# Patient Record
Sex: FEMALE | Race: WHITE | HISPANIC OR LATINO | Employment: FULL TIME | URBAN - METROPOLITAN AREA
[De-identification: names, ages, dates, MRNs, and addresses within clinical notes are randomized per-mention and may not be internally consistent; named-entity substitution may affect disease eponyms.]

---

## 2017-01-26 ENCOUNTER — OFFICE VISIT (OUTPATIENT)
Dept: PEDIATRICS | Facility: CLINIC | Age: 27
End: 2017-01-26
Payer: COMMERCIAL

## 2017-01-26 VITALS
HEART RATE: 79 BPM | DIASTOLIC BLOOD PRESSURE: 58 MMHG | OXYGEN SATURATION: 98 % | HEIGHT: 65 IN | SYSTOLIC BLOOD PRESSURE: 101 MMHG | WEIGHT: 143.8 LBS | BODY MASS INDEX: 23.96 KG/M2 | TEMPERATURE: 96.9 F

## 2017-01-26 DIAGNOSIS — R39.9 UTI SYMPTOMS: Primary | ICD-10-CM

## 2017-01-26 DIAGNOSIS — N30.00 ACUTE CYSTITIS WITHOUT HEMATURIA: ICD-10-CM

## 2017-01-26 DIAGNOSIS — R82.90 NONSPECIFIC FINDING ON EXAMINATION OF URINE: ICD-10-CM

## 2017-01-26 LAB
ALBUMIN UR-MCNC: 30 MG/DL
APPEARANCE UR: ABNORMAL
BACTERIA #/AREA URNS HPF: ABNORMAL /HPF
BILIRUB UR QL STRIP: NEGATIVE
COLOR UR AUTO: YELLOW
GLUCOSE UR STRIP-MCNC: NEGATIVE MG/DL
HGB UR QL STRIP: ABNORMAL
KETONES UR STRIP-MCNC: 5 MG/DL
LEUKOCYTE ESTERASE UR QL STRIP: ABNORMAL
MUCOUS THREADS #/AREA URNS LPF: PRESENT /LPF
NITRATE UR QL: NEGATIVE
NON-SQ EPI CELLS #/AREA URNS LPF: ABNORMAL /LPF
PH UR STRIP: 6 PH (ref 5–7)
RBC #/AREA URNS AUTO: ABNORMAL /HPF (ref 0–2)
SP GR UR STRIP: 1.03 (ref 1–1.03)
URN SPEC COLLECT METH UR: ABNORMAL
UROBILINOGEN UR STRIP-MCNC: 2 MG/DL (ref 0–2)
WBC #/AREA URNS AUTO: ABNORMAL /HPF (ref 0–2)

## 2017-01-26 PROCEDURE — 99213 OFFICE O/P EST LOW 20 MIN: CPT | Performed by: INTERNAL MEDICINE

## 2017-01-26 PROCEDURE — 81001 URINALYSIS AUTO W/SCOPE: CPT | Performed by: INTERNAL MEDICINE

## 2017-01-26 PROCEDURE — 87086 URINE CULTURE/COLONY COUNT: CPT | Performed by: INTERNAL MEDICINE

## 2017-01-26 PROCEDURE — 87088 URINE BACTERIA CULTURE: CPT | Performed by: INTERNAL MEDICINE

## 2017-01-26 PROCEDURE — 87186 SC STD MICRODIL/AGAR DIL: CPT | Performed by: INTERNAL MEDICINE

## 2017-01-26 RX ORDER — NITROFURANTOIN 25; 75 MG/1; MG/1
100 CAPSULE ORAL 2 TIMES DAILY
Qty: 14 CAPSULE | Refills: 0 | Status: SHIPPED | OUTPATIENT
Start: 2017-01-26 | End: 2017-06-29

## 2017-01-26 NOTE — PATIENT INSTRUCTIONS
Urinary Tract Infections in Women  Urinary tract infections (UTIs) are most often caused by bacteria (germs). These bacteria enter the urinary tract. The bacteria may come from outside the body. Or they may travel from the skin outside the rectum or vagina into the urethra. Female anatomy makes it easier for bacteria from the bowel to enter a woman s urinary tract, which is the most common source of UTI. This means women develop UTIs more often than men. Pain in or around the urinary tract is a common UTI symptom. But the only way to know for sure if you have a UTI for the health care provider to test your urine. The two tests that may be done are the urinalysis and urine culture.  Types of UTIs    Cystitis: A bladder infection (cystitis) is the most common UTI in women. You may have urgent or frequent urination. You may also have pain, burning when you urinate, and bloody urine.    Urethritis: This is an inflamed urethra, which is the tube that carries urine from the bladder to outside the body. You may have lower stomach or back pain. You may also have urgent or frequent urination.    Pyelonephritis: This is a kidney infection. If not treated, it can be serious and damage your kidneys. In severe cases, you may be hospitalized. You may have a fever and lower back pain.  Medications to treat a UTI  Most UTIs are treated with antibiotics. These kill the bacteria. The length of time you need to take them depends on the type of infection. It may be as short as 3 days. If you have repeated UTIs, a low-dose antibiotic may be needed for several months. Take antibiotics exactly as directed. Don t stop taking them until all of the medication is gone. If you stop taking the antibiotic too soon, the infection may not go away, and you may develop a resistance to the antibiotic. This can make it much harder to treat.  Lifestyle changes to treat and prevent UTIs  The lifestyle changes below will help get rid of your UTI. They  may also help prevent future UTIs.    Drink plenty of fluids. This includes water, juice, or other caffeine-free drinks. Fluids help flush bacteria out of your body.    Empty your bladder. Always empty your bladder when you feel the urge to urinate. And always urinate before going to sleep. Urine that stays in your bladder can lead to infection. Try to urinate before and after sex as well.    Practice good personal hygiene. Wipe yourself from front to back after using the toilet. This helps keep bacteria from getting into the urethra.    Use condoms during sex. These help prevent UTIs caused by sexually transmitted bacteria. Also, avoid using spermicides during sex. These can increase the risk of UTIs. Choose other forms of birth control instead. For women who tend to get UTIs after sex, a low-dose of a preventive antibiotic may be used. Be sure to discuss this option with your health care provider.    Follow up with your health care provider as directed. He or she may test to make sure the infection has cleared. If necessary, additional treatment may be started.    2846-8860 [employer]. [address] [place] [address], [place] [address]. All rights reserved. This information is not intended as a substitute for professional medical care. Always follow your healthcare professional's instructions.

## 2017-01-26 NOTE — NURSING NOTE
"Chief Complaint   Patient presents with     UTI       Initial /58 mmHg  Pulse 79  Temp(Src) 96.9  F (36.1  C) (Oral)  Ht 5' 5\" (1.651 m)  Wt 143 lb 12.8 oz (65.227 kg)  BMI 23.93 kg/m2  SpO2 98% Estimated body mass index is 23.93 kg/(m^2) as calculated from the following:    Height as of this encounter: 5' 5\" (1.651 m).    Weight as of this encounter: 143 lb 12.8 oz (65.227 kg).  BP completed using cuff size: jammie Mcintyre CMA    "

## 2017-01-26 NOTE — MR AVS SNAPSHOT
After Visit Summary   1/26/2017    Mere Whelan    MRN: [id number]           Patient Information     Date Of Birth          1990        Visit Information        Provider Department      1/26/2017 2:40 PM Arden Aburto MD [facility]        Today's Diagnoses     UTI symptoms    -  1     Nonspecific finding on examination of urine         Acute cystitis without hematuria           Care Instructions      Urinary Tract Infections in Women  Urinary tract infections (UTIs) are most often caused by bacteria (germs). These bacteria enter the urinary tract. The bacteria may come from outside the body. Or they may travel from the skin outside the rectum or vagina into the urethra. Female anatomy makes it easier for bacteria from the bowel to enter a woman s urinary tract, which is the most common source of UTI. This means women develop UTIs more often than men. Pain in or around the urinary tract is a common UTI symptom. But the only way to know for sure if you have a UTI for the health care provider to test your urine. The two tests that may be done are the urinalysis and urine culture.  Types of UTIs    Cystitis: A bladder infection (cystitis) is the most common UTI in women. You may have urgent or frequent urination. You may also have pain, burning when you urinate, and bloody urine.    Urethritis: This is an inflamed urethra, which is the tube that carries urine from the bladder to outside the body. You may have lower stomach or back pain. You may also have urgent or frequent urination.    Pyelonephritis: This is a kidney infection. If not treated, it can be serious and damage your kidneys. In severe cases, you may be hospitalized. You may have a fever and lower back pain.  Medications to treat a UTI  Most UTIs are treated with antibiotics. These kill the bacteria. The length of time you need to take them depends on the type of infection. It may be as short as 3 days. If  you have repeated UTIs, a low-dose antibiotic may be needed for several months. Take antibiotics exactly as directed. Don t stop taking them until all of the medication is gone. If you stop taking the antibiotic too soon, the infection may not go away, and you may develop a resistance to the antibiotic. This can make it much harder to treat.  Lifestyle changes to treat and prevent UTIs  The lifestyle changes below will help get rid of your UTI. They may also help prevent future UTIs.    Drink plenty of fluids. This includes water, juice, or other caffeine-free drinks. Fluids help flush bacteria out of your body.    Empty your bladder. Always empty your bladder when you feel the urge to urinate. And always urinate before going to sleep. Urine that stays in your bladder can lead to infection. Try to urinate before and after sex as well.    Practice good personal hygiene. Wipe yourself from front to back after using the toilet. This helps keep bacteria from getting into the urethra.    Use condoms during sex. These help prevent UTIs caused by sexually transmitted bacteria. Also, avoid using spermicides during sex. These can increase the risk of UTIs. Choose other forms of birth control instead. For women who tend to get UTIs after sex, a low-dose of a preventive antibiotic may be used. Be sure to discuss this option with your health care provider.    Follow up with your health care provider as directed. He or she may test to make sure the infection has cleared. If necessary, additional treatment may be started.    5149-4365 [employer]. [address] [place] [address], [place] [address]. All rights reserved. This information is not intended as a substitute for professional medical care. Always follow your healthcare professional's instructions.              Follow-ups after your visit        Who to contact     If you have questions or need follow up information about today's clinic visit or your schedule please  "contact [facility] directly at [phone].  Normal or non-critical lab and imaging results will be communicated to you by [employer], letter or phone within 4 business days after the clinic has received the results. If you do not hear from us within 7 days, please contact the clinic through [employer] or phone. If you have a critical or abnormal lab result, we will notify you by phone as soon as possible.  Submit refill requests through [employer] or call your pharmacy and they will forward the refill request to us. Please allow 3 business days for your refill to be completed.          Additional Information About Your Visit        [employer] Information     [employer] gives you secure access to your electronic health record. If you see a primary care provider, you can also send messages to your care team and make appointments. If you have questions, please call your primary care clinic.  If you do not have a primary care provider, please call [phone] and they will assist you.      [employer] is an electronic gateway that provides easy, online access to your medical records. With [employer], you can request a clinic appointment, read your test results, renew a prescription or communicate with your care team.     To access your existing account, please contact your [facility] or call [phone] for assistance.        Care EveryWhere ID     This is your Care EveryWhere ID. This could be used by other organizations to access your [place] medical records  [id number]        Your Vitals Were     Pulse Temperature Height BMI (Body Mass Index) Pulse Oximetry       79 96.9  F (36.1  C) (Oral) 5' 5\" (1.651 m) 23.93 kg/m2 98%        Blood Pressure from Last 3 Encounters:   01/26/17 101/58   05/31/16 117/69   02/23/16 116/69    Weight from Last 3 Encounters:   01/26/17 143 lb 12.8 oz (65.227 kg)   05/31/16 153 lb (69.4 kg)   02/23/16 168 lb (76.204 kg)              We Performed the " Following     UA with Microscopic reflex to Culture ([place])     Urine Culture Aerobic Bacterial          Today's Medication Changes          These changes are accurate as of: 1/26/17  3:19 PM.  If you have any questions, ask your nurse or doctor.               Start taking these medicines.        Dose/Directions    nitrofurantoin (macrocrystal-monohydrate) 100 MG capsule   Commonly known as:  MACROBID   Used for:  Acute cystitis without hematuria   Started by:  Arden Aburto MD        Dose:  100 mg   Take 1 capsule (100 mg) by mouth 2 times daily   Quantity:  14 capsule   Refills:  0            Where to get your medicines      These medications were sent to [id number] - [place], [place] - [address]  [address] [address]     Phone:  [phone]    - nitrofurantoin (macrocrystal-monohydrate) 100 MG capsule             Primary Care Provider    [facility]       No address on file        Thank you!     Thank you for choosing [facility]  for your care. Our goal is always to provide you with excellent care. Hearing back from our patients is one way we can continue to improve our services. Please take a few minutes to complete the written survey that you may receive in the mail after your visit with us. Thank you!             Your Updated Medication List - Protect others around you: Learn how to safely use, store and throw away your medicines at www.disposemymeds.org.          This list is accurate as of: 1/26/17  3:19 PM.  Always use your most recent med list.                   Brand Name Dispense Instructions for use    APNO ointment Crea cream     30 g    Please use after every feed, can use up to every hour. Do not have to wash off in between feeds.       buPROPion 150 MG 12 hr tablet    WELLBUTRIN SR    60 tablet    Take 1 tablet once daily and increase to 1 tablet twice daily after 4 to 7 days       nitrofurantoin  (macrocrystal-monohydrate) 100 MG capsule    MACROBID    14 capsule    Take 1 capsule (100 mg) by mouth 2 times daily       norelgestromin-ethinyl estradiol 150-35 MCG/24HR patch    ORTHO EVRA    3 patch    Place 1 patch onto the skin once a week       norethindrone 0.35 MG per tablet    MICRONOR    28 tablet    Take 1 tablet (0.35 mg) by mouth daily       prenatal multivitamin  with iron 28-0.8 MG Tabs     90 each    Take 1 tablet by mouth daily       sertraline 50 MG tablet    ZOLOFT    30 tablet    Take 1/2 tablet (25 mg) for 1-2 weeks, then increase to 1 tablet orally daily       TYLENOL PO

## 2017-01-28 LAB
BACTERIA SPEC CULT: ABNORMAL
MICRO REPORT STATUS: ABNORMAL
MICROORGANISM SPEC CULT: ABNORMAL
SPECIMEN SOURCE: ABNORMAL

## 2017-06-26 DIAGNOSIS — Z30.018 ENCOUNTER FOR INITIAL PRESCRIPTION OF OTHER CONTRACEPTIVES: ICD-10-CM

## 2017-06-26 RX ORDER — NORELGESTROMIN AND ETHINYL ESTRADIOL 35; 150 UG/MG; UG/MG
1 PATCH TRANSDERMAL WEEKLY
Qty: 3 PATCH | Refills: 12 | Status: CANCELLED | OUTPATIENT
Start: 2017-06-26

## 2017-06-26 NOTE — TELEPHONE ENCOUNTER
norelgestromin-ethinyl estradiol (ORTHO EVRA) 150-35 MCG/24HR patch      Last Written Prescription Date: 07/08/16  Last Fill Quantity: 3,  # refills: 12   Last Office Visit with G, P or [facility] prescribing provider: 01/26/17                                         Next 5 appointments (look out 90 days)     Jun 29, 2017  9:30 AM CDT   Office Visit with Jennifer Eugene DO   [facility]    [address] [place] [id number]   [phone]

## 2017-06-29 ENCOUNTER — OFFICE VISIT (OUTPATIENT)
Dept: OBGYN | Facility: CLINIC | Age: 27
End: 2017-06-29
Payer: COMMERCIAL

## 2017-06-29 VITALS
SYSTOLIC BLOOD PRESSURE: 101 MMHG | DIASTOLIC BLOOD PRESSURE: 60 MMHG | OXYGEN SATURATION: 96 % | BODY MASS INDEX: 23.75 KG/M2 | WEIGHT: 142.7 LBS | HEART RATE: 67 BPM

## 2017-06-29 DIAGNOSIS — Z00.00 ROUTINE GENERAL MEDICAL EXAMINATION AT A HEALTH CARE FACILITY: Primary | ICD-10-CM

## 2017-06-29 DIAGNOSIS — Z13.220 LIPID SCREENING: ICD-10-CM

## 2017-06-29 DIAGNOSIS — Z13.29 SCREENING FOR THYROID DISORDER: ICD-10-CM

## 2017-06-29 DIAGNOSIS — Z30.018 ENCOUNTER FOR INITIAL PRESCRIPTION OF OTHER CONTRACEPTIVES: ICD-10-CM

## 2017-06-29 DIAGNOSIS — Z13.1 SCREENING FOR DIABETES MELLITUS: ICD-10-CM

## 2017-06-29 PROCEDURE — G0145 SCR C/V CYTO,THINLAYER,RESCR: HCPCS | Performed by: OBSTETRICS & GYNECOLOGY

## 2017-06-29 PROCEDURE — 99395 PREV VISIT EST AGE 18-39: CPT | Performed by: OBSTETRICS & GYNECOLOGY

## 2017-06-29 RX ORDER — NORELGESTROMIN AND ETHINYL ESTRADIOL 35; 150 UG/MG; UG/MG
1 PATCH TRANSDERMAL WEEKLY
Qty: 3 PATCH | Refills: 12 | Status: SHIPPED | OUTPATIENT
Start: 2017-06-29 | End: 2020-08-12

## 2017-06-29 ASSESSMENT — ANXIETY QUESTIONNAIRES
7. FEELING AFRAID AS IF SOMETHING AWFUL MIGHT HAPPEN: NOT AT ALL
2. NOT BEING ABLE TO STOP OR CONTROL WORRYING: SEVERAL DAYS
IF YOU CHECKED OFF ANY PROBLEMS ON THIS QUESTIONNAIRE, HOW DIFFICULT HAVE THESE PROBLEMS MADE IT FOR YOU TO DO YOUR WORK, TAKE CARE OF THINGS AT HOME, OR GET ALONG WITH OTHER PEOPLE: NOT DIFFICULT AT ALL
GAD7 TOTAL SCORE: 6
3. WORRYING TOO MUCH ABOUT DIFFERENT THINGS: SEVERAL DAYS
1. FEELING NERVOUS, ANXIOUS, OR ON EDGE: SEVERAL DAYS
5. BEING SO RESTLESS THAT IT IS HARD TO SIT STILL: NOT AT ALL
6. BECOMING EASILY ANNOYED OR IRRITABLE: NEARLY EVERY DAY

## 2017-06-29 ASSESSMENT — PATIENT HEALTH QUESTIONNAIRE - PHQ9: 5. POOR APPETITE OR OVEREATING: NOT AT ALL

## 2017-06-29 NOTE — MR AVS SNAPSHOT
After Visit Summary   6/29/2017    Mere Whelan    MRN: [id number]           Patient Information     Date Of Birth          1990        Visit Information        Provider Department      6/29/2017 9:30 AM Jennifer Eugene DO [facility]        Today's Diagnoses     Routine general medical examination at a health care facility    -  1    Encounter for initial prescription of other contraceptives          Care Instructions                                                         If you have any questions regarding your visit, Please contact your care team.    [facility] CLINIC HOURS TELEPHONE NUMBER   Jennifer Eugene DO.    MICHELLE Washburn -    MIKHAIL Kelley RN       Monday, Wednesday, Thursday and Friday, [place]  8:30a.m-5:00 p.m   [facility]  [address] [place] [address]  [phone] ask for [facility]    Imaging [phone]       Urgent Care locations:    [facility] Saturday and Sunday   9 am - 5 pm    Monday-Friday   12 pm - 8 pm  Saturday and Sunday   9 am - 5 pm   [phone]     [facility] Labor and Delivery:  [phone]    If you need a medication refill, please contact your pharmacy. Please allow 3 business days for your refill to be completed.  As always, Thank you for trusting us with your healthcare needs!                Follow-ups after your visit        Who to contact     If you have questions or need follow up information about today's clinic visit or your schedule please contact [facility] directly at [phone].  Normal or non-critical lab and imaging results will be communicated to you by MyChart, letter or phone within 4 business days after the clinic has received the results. If you do not hear from us within 7 days, please contact the clinic through MyChart or phone. If you have a critical or  abnormal lab result, we will notify you by phone as soon as possible.  Submit refill requests through [employer] or call your pharmacy and they will forward the refill request to us. Please allow 3 business days for your refill to be completed.          Additional Information About Your Visit        [employer] Information     [employer] gives you secure access to your electronic health record. If you see a primary care provider, you can also send messages to your care team and make appointments. If you have questions, please call your primary care clinic.  If you do not have a primary care provider, please call [phone] and they will assist you.        Care EveryWhere ID     This is your Care EveryWhere ID. This could be used by other organizations to access your [place] medical records  [id number]        Your Vitals Were     Pulse Last Period Pulse Oximetry Breastfeeding? BMI (Body Mass Index)       67 (LMP Unknown) 96% No 23.75 kg/m2        Blood Pressure from Last 3 Encounters:   06/29/17 101/60   01/26/17 101/58   05/31/16 117/69    Weight from Last 3 Encounters:   06/29/17 64.7 kg (142 lb 11.2 oz)   01/26/17 65.2 kg (143 lb 12.8 oz)   05/31/16 69.4 kg (153 lb)              Today, you had the following     No orders found for display         Today's Medication Changes          These changes are accurate as of: 6/29/17  9:38 AM.  If you have any questions, ask your nurse or doctor.               Stop taking these medicines if you haven't already. Please contact your care team if you have questions.     APNO ointment Crea cream   Stopped by:  Jennifer Eugene, DO           buPROPion 150 MG 12 hr tablet   Commonly known as:  WELLBUTRIN SR   Stopped by:  Jennifer Eugene DO           nitroFURantoin (macrocrystal-monohydrate) 100 MG capsule   Commonly known as:  MACROBID   Stopped by:  Jennifer Eugene DO           norethindrone 0.35 MG per tablet   Commonly known as:  MICRONOR   Stopped by:   Jennifer Eugene, DO           prenatal multivitamin  with iron 28-0.8 MG Tabs   Stopped by:  Jennifer Eugene DO           sertraline 50 MG tablet   Commonly known as:  ZOLOFT   Stopped by:  Jennifer Eugene DO           TYLENOL PO   Stopped by:  Jennifer Eugene,                     Primary Care Provider    [facility]       No address on file        Equal Access to Services     ALIYAH MAGALLANES : Hadii aad ku hadasho Soomaali, waaxda luqadaha, qaybta kaalmada adeegyada, waxay idiin hayaan adeeg khhaileesh la'aan ah. So [facility] [phone].    ATENCIÓN: Si joshua raya, tiene a cottrell disposición servicios gratuitos de asistencia lingüística. Llame al [phone].    We comply with applicable federal civil rights laws and [place] laws. We do not discriminate on the basis of race, color, national origin, age, disability sex, sexual orientation or gender identity.            Thank you!     Thank you for choosing [facility]  for your care. Our goal is always to provide you with excellent care. Hearing back from our patients is one way we can continue to improve our services. Please take a few minutes to complete the written survey that you may receive in the mail after your visit with us. Thank you!             Your Updated Medication List - Protect others around you: Learn how to safely use, store and throw away your medicines at www.disposemymeds.org.          This list is accurate as of: 6/29/17  9:38 AM.  Always use your most recent med list.                   Brand Name Dispense Instructions for use Diagnosis    norelgestromin-ethinyl estradiol 150-35 MCG/24HR patch    ORTHO EVRA    3 patch    Place 1 patch onto the skin once a week    Encounter for initial prescription of other contraceptives

## 2017-06-29 NOTE — NURSING NOTE
"Chief Complaint   Patient presents with     Physical     discuss changing birth control       Initial /60  Pulse 67  Wt 64.7 kg (142 lb 11.2 oz)  LMP  (LMP Unknown)  SpO2 96%  Breastfeeding? No  BMI 23.75 kg/m2 Estimated body mass index is 23.75 kg/(m^2) as calculated from the following:    Height as of 1/26/17: 1.651 m (5' 5\").    Weight as of this encounter: 64.7 kg (142 lb 11.2 oz).  Medication Reconciliation:   Maddison Salomon, [facility]  June 29, 2017 9:38 AM        "

## 2017-06-29 NOTE — PROGRESS NOTES
Mere De León is a 26 year old female, , who is here for her annual exam.  She would also like to discuss her contraceptive options since she dislikes the patch.  She is not in a fasting state this morning so will place future orders.      ROS: Ten point review of systems was reviewed and negative except the above.    Health Maintenance   Topic Date Due     INFLUENZA VACCINE (SYSTEM ASSIGNED)  2017     PAP Q3 YR  2018     TETANUS IMMUNIZATION (SYSTEM ASSIGNED)  10/08/2023      Last pap: 1/22/15 normal  Last Mammogram: not applicable  Last Dexa: not applicable  Last Colonoscopy: not applicable  No results found for: CHOL  No results found for: HDL  No results found for: LDL  No results found for: TRIG  No results found for: CHOLHDLRATIO      OBHX:      PSH:   Past Surgical History:   Procedure Laterality Date     ABDOMEN SURGERY  11         GYN SURGERY      colposcopy         PMH: Her past medical, surgical, and obstetric histories were reviewed and are documented in their appropriate chart areas.    ALL/Meds: Her medication and allergy histories were reviewed and are documented in their appropriate chart areas.    SH/FMH: Her social and family history was reviewed and documented in its appropriate chart area.    PE: /60  Pulse 67  Wt 64.7 kg (142 lb 11.2 oz)  LMP  (LMP Unknown)  SpO2 96%  Breastfeeding? No  BMI 23.75 kg/m2  Body mass index is 23.75 kg/(m^2).    General Appearance:  healthy, alert, active, no distress  Cardiovascular:  Regular rate and Rhythm without murmur  Neck: Supple, no adenopathy and thyroid normal  Lungs:  Clear, without wheeze, rale or rhonchi  Breast: normal breast exam  Abdomen: Benign, Soft, flat, non-tender, No masses, organomegaly, No inguinal nodes and Bowel sounds normoactive.   Pelvic:       - Ext: Vulva and perineum are normal without lesion, mass or discharge        - Urethra: normal without discharge        - Urethral Meatus: normal  appearance       - Bladder: no tenderness, no masses       - Vagina: Normal mucosa, no discharge        - Cervix: normal and nulliparous       - Uterus:Normal shape, position and consistency        - Adnexa: Normal without masses or tenderness       - Rectal: deferred    A/P:  [facility]     -  I discussed the new pap recommendations regarding screening.  Explained the rationale for increased intervals between paps.  Questions asked and answered.  She does agree to this regiment.  Pap was obtained and submitted   -  BC: We discussed her options and she is not interested in sterilization since she plans to retry for pregnancy in 2-3 years.  After today's discussion, she plans to return for insertion of a Nexplanon insert and a pamphlet on this was provided.  She prefers to call back to schedule this and will use foam and condoms in the interim.   - Return for fasting labwork - lipid profile, glucose, and TSH   - Encouraged self-breast exam   - Encouraged low fat diet, regular exercise, and adequate calcium intake   - Advised to reduce her daily pop intake since she averages one can of soda pop daily   - She declines a referral to Mental Health for depression and anxiety issues.    Jennifer Eugene,   FACOG, FACS

## 2017-06-29 NOTE — PATIENT INSTRUCTIONS
If you have any questions regarding your visit, Please contact your care team.    [facility] CLINIC HOURS TELEPHONE NUMBER   Jennifer DO Jabier.    MICHELLE Washburn -    MIKHAIL Kelley RN       Monday, Wednesday, Thursday and Friday, [place]  8:30a.m-5:00 p.m   [facility]  [address] [place] [address]  [phone] ask for [facility]    Imaging [phone]       Urgent Care locations:    [facility] Saturday and Sunday   9 am - 5 pm    Monday-Friday   12 pm - 8 pm  Saturday and Sunday   9 am - 5 pm   [phone]     [facility] Labor and Delivery:  [phone]    If you need a medication refill, please contact your pharmacy. Please allow 3 business days for your refill to be completed.  As always, Thank you for trusting us with your healthcare needs!

## 2017-06-30 ASSESSMENT — PATIENT HEALTH QUESTIONNAIRE - PHQ9: SUM OF ALL RESPONSES TO PHQ QUESTIONS 1-9: 9

## 2017-06-30 ASSESSMENT — ANXIETY QUESTIONNAIRES: GAD7 TOTAL SCORE: 6

## 2017-07-05 LAB
COPATH REPORT: NORMAL
PAP: NORMAL

## 2017-07-19 ENCOUNTER — TELEPHONE (OUTPATIENT)
Dept: OBGYN | Facility: CLINIC | Age: 27
End: 2017-07-19

## 2017-07-19 NOTE — TELEPHONE ENCOUNTER
[facility] Call Center    Phone Message    Name of Caller: Mere De León    Phone Number: Home number on file [phone] (home)    Best time to return call: Any    May a detailed message be left on voicemail: yes    Relation to patient: Self    Reason for Call: Mere De León called to schedule Nexplanon Insertion.  She just finished her cycle and is requesting a birth control that she can use until her next cycle.  Requesting a call to discuss.  Thank you.      Action Taken: Message routed to:  [facility] p [phone]

## 2017-07-20 NOTE — TELEPHONE ENCOUNTER
I called patient and have her scheduled 8/17/2017 with Dr. Eugene in  for the Nexplanon insertion.  Her LMP was 7/13/2017.  This way she will be on her cycle.  Patient is aware of the patch that is available at the [facility] in .  Selam Hayden RN

## 2017-08-17 ENCOUNTER — OFFICE VISIT (OUTPATIENT)
Dept: OBGYN | Facility: CLINIC | Age: 27
End: 2017-08-17
Payer: COMMERCIAL

## 2017-08-17 VITALS
OXYGEN SATURATION: 99 % | HEART RATE: 77 BPM | DIASTOLIC BLOOD PRESSURE: 70 MMHG | SYSTOLIC BLOOD PRESSURE: 113 MMHG | BODY MASS INDEX: 24.41 KG/M2 | WEIGHT: 146.7 LBS

## 2017-08-17 DIAGNOSIS — Z32.00 PREGNANCY EXAMINATION OR TEST, PREGNANCY UNCONFIRMED: ICD-10-CM

## 2017-08-17 DIAGNOSIS — Z30.017 NEXPLANON INSERTION: Primary | ICD-10-CM

## 2017-08-17 LAB — BETA HCG QUAL IFA URINE: NEGATIVE

## 2017-08-17 PROCEDURE — 84703 CHORIONIC GONADOTROPIN ASSAY: CPT | Performed by: OBSTETRICS & GYNECOLOGY

## 2017-08-17 PROCEDURE — 11981 INSERTION DRUG DLVR IMPLANT: CPT | Performed by: OBSTETRICS & GYNECOLOGY

## 2017-08-17 PROCEDURE — 99213 OFFICE O/P EST LOW 20 MIN: CPT | Mod: 25 | Performed by: OBSTETRICS & GYNECOLOGY

## 2017-08-17 NOTE — MR AVS SNAPSHOT
After Visit Summary   8/17/2017    Mere Whelan    MRN: [id number]           Patient Information     Date Of Birth          1990        Visit Information        Provider Department      8/17/2017 10:00 AM Jennifer Eugene DO; PROC RM 1 [facility]        Today's Diagnoses     Pregnancy examination or test, pregnancy unconfirmed    -  1      Care Instructions                                                         If you have any questions regarding your visit, Please contact your care team.    [facility] CLINIC HOURS TELEPHONE NUMBER   Jennifer Eugene DO.    MICHELLE Washburn -    MIKHAIL Kelley, RN       Monday, Wednesday, [facility]  8:30a.m-5:00 p.m   [facility]  [address] [place] [address]  [phone] ask for [facility]    Imaging [phone]       Urgent Care locations:    [facility] Saturday and Sunday   9 am - 5 pm    Monday-Friday   12 pm - 8 pm  Saturday and Sunday   9 am - 5 pm   [phone]     [facility] Labor and Delivery:  [phone]    If you need a medication refill, please contact your pharmacy. Please allow 3 business days for your refill to be completed.  As always, Thank you for trusting us with your healthcare needs!                Follow-ups after your visit        Who to contact     If you have questions or need follow up information about today's clinic visit or your schedule please contact [facility] directly at [phone].  Normal or non-critical lab and imaging results will be communicated to you by MyChart, letter or phone within 4 business days after the clinic has received the results. If you do not hear from us within 7 days, please contact the clinic through MyChart or phone. If you have a critical or abnormal lab result, we will notify you by phone as  "soon as possible.  Submit refill requests through whodoyou or call your pharmacy and they will forward the refill request to us. Please allow 3 business days for your refill to be completed.          Additional Information About Your Visit        [employer] Information     whodoyou lets you send messages to your doctor, view your test results, renew your prescriptions, schedule appointments and more. To sign up, go to [email or web address] . Click on \"Log in\" on the left side of the screen, which will take you to the Welcome page. Then click on \"Sign up Now\" on the right side of the page.     You will be asked to enter the access code listed below, as well as some personal information. Please follow the directions to create your username and password.     Your access code is: [email or web address]  Expires: 11/15/2017 10:39 AM     Your access code will  in 90 days. If you need help or a new code, please call your [place] clinic or [phone].        Care EveryWhere ID     This is your Care EveryWhere ID. This could be used by other organizations to access your [place] medical records  [id number]        Your Vitals Were     Pulse Last Period Pulse Oximetry Breastfeeding? BMI (Body Mass Index)       77 2017 99% No 24.41 kg/m2        Blood Pressure from Last 3 Encounters:   17 113/70   17 101/60   17 101/58    Weight from Last 3 Encounters:   17 66.5 kg (146 lb 11.2 oz)   17 64.7 kg (142 lb 11.2 oz)   17 65.2 kg (143 lb 12.8 oz)              We Performed the Following     Beta HCG qual IFA urine        Primary Care Provider    [facility]       No address on file        Equal Access to Services     ALIYAH MAGALLANES : Yary Boston, alicia dunlap, jenae cintron, eduar aguila. So [facility] [phone].    ATENCIÓN: Si habla español, tiene a cottrell disposición servicios gratuitos de asistencia lingüística. " Husam sparrow [phone].    We comply with applicable federal civil rights laws and [place] laws. We do not discriminate on the basis of race, color, national origin, age, disability sex, sexual orientation or gender identity.            Thank you!     Thank you for choosing [facility]  for your care. Our goal is always to provide you with excellent care. Hearing back from our patients is one way we can continue to improve our services. Please take a few minutes to complete the written survey that you may receive in the mail after your visit with us. Thank you!             Your Updated Medication List - Protect others around you: Learn how to safely use, store and throw away your medicines at www.disposemymeds.org.          This list is accurate as of: 8/17/17 10:39 AM.  Always use your most recent med list.                   Brand Name Dispense Instructions for use Diagnosis    norelgestromin-ethinyl estradiol 150-35 MCG/24HR patch    ORTHO EVRA    3 patch    Place 1 patch onto the skin once a week    Encounter for initial prescription of other contraceptives

## 2017-08-17 NOTE — NURSING NOTE
"Chief Complaint   Patient presents with     Contraception     Nexplanon insertion       Initial /70  Pulse 77  Wt 66.5 kg (146 lb 11.2 oz)  LMP 08/12/2017  SpO2 99%  Breastfeeding? No  BMI 24.41 kg/m2 Estimated body mass index is 24.41 kg/(m^2) as calculated from the following:    Height as of 1/26/17: 1.651 m (5' 5\").    Weight as of this encounter: 66.5 kg (146 lb 11.2 oz).  Medication Reconciliation:   Maddison Salomon, [facility]  August 17, 2017 10:39 AM        "

## 2017-08-17 NOTE — PROGRESS NOTES
"This 26 y/o female, , LMP 17, presents for insertion of Nexplanon for contraception since she disliked the Ortho Evra patch.  However, she has been using the patch recently to avoid pregnancy and today's UPT is negative.  Informed consent was reviewed and obtained for Nexplanon insertion and her questions were addressed.  /70  Pulse 77  Wt 66.5 kg (146 lb 11.2 oz)  LMP 2017  SpO2 99%  Breastfeeding? No  BMI 24.41 kg/m2  Since she is right-handed, her left arm was flexed at the elbow with her left hand lying next to her left ear.  A selene was made using a purple pen 8 cm medial to the epicondyle, and a guide selene was made 2 cm medial to this first selene.  The skin was then cleansed with betadine x 3 swabs followed by 2 etoh pads.  1% Lidocaine was used for local anesthesia and 8 ccs was provided with spillage near the entry site but not including the purple pen selene.  She had difficulty relaxing and I had Maddison, the MA, remain with her due to her extreme nervousness.  After testing the site, the Nexplanon was inserted at the appropriate angle and the entry site was then covered with 2 steristrips after Tincture of Benzoin was applied to the skin.  A Band-aid was placed followed by an ACE wrap and the EBL was 2 ccs.  There were no complications and post-procedural directions were reviewed with the patient.  She was also provided a written instruction sheet on Nexplanon.  She understands to continue use of the patch for 7 more days given that today is cycle day #6.  She was provided the reminder card and I offered to have her feel the insert but she declined since she stated that it was all \"too freaky.\"  She understands that it will be due for removal in 2020.  This was a 25-minute visit and 10 minutes of this time were spent in the procedure.  NDC #8467-8228-17  Exo 2019  Lot #[id number]  [id number]  "

## 2017-08-17 NOTE — PATIENT INSTRUCTIONS
If you have any questions regarding your visit, Please contact your care team.    [facility] CLINIC HOURS TELEPHONE NUMBER   Jennifer DO Jabier.    MICHELLE Washburn -    MIKHAIL Kelley RN       Monday, Wednesday, Thursday and Friday, [place]  8:30a.m-5:00 p.m   [facility]  [address] [place] [address]  [phone] ask for [facility]    Imaging [phone]       Urgent Care locations:    [facility] Saturday and Sunday   9 am - 5 pm    Monday-Friday   12 pm - 8 pm  Saturday and Sunday   9 am - 5 pm   [phone]     [facility] Labor and Delivery:  [phone]    If you need a medication refill, please contact your pharmacy. Please allow 3 business days for your refill to be completed.  As always, Thank you for trusting us with your healthcare needs!

## 2019-04-04 ENCOUNTER — OFFICE VISIT (OUTPATIENT)
Dept: URGENT CARE | Facility: URGENT CARE | Age: 29
End: 2019-04-04
Payer: COMMERCIAL

## 2019-04-04 VITALS
TEMPERATURE: 98.2 F | HEART RATE: 88 BPM | OXYGEN SATURATION: 97 % | WEIGHT: 140 LBS | BODY MASS INDEX: 23.3 KG/M2 | RESPIRATION RATE: 20 BRPM | SYSTOLIC BLOOD PRESSURE: 110 MMHG | DIASTOLIC BLOOD PRESSURE: 62 MMHG

## 2019-04-04 DIAGNOSIS — J02.9 SORE THROAT: ICD-10-CM

## 2019-04-04 DIAGNOSIS — J02.0 ACUTE STREPTOCOCCAL PHARYNGITIS: Primary | ICD-10-CM

## 2019-04-04 LAB
DEPRECATED S PYO AG THROAT QL EIA: ABNORMAL
SPECIMEN SOURCE: ABNORMAL

## 2019-04-04 PROCEDURE — 87880 STREP A ASSAY W/OPTIC: CPT | Performed by: FAMILY MEDICINE

## 2019-04-04 PROCEDURE — 99213 OFFICE O/P EST LOW 20 MIN: CPT | Performed by: FAMILY MEDICINE

## 2019-04-04 RX ORDER — PENICILLIN V POTASSIUM 500 MG/1
500 TABLET, FILM COATED ORAL 2 TIMES DAILY
Qty: 20 TABLET | Refills: 0 | Status: SHIPPED | OUTPATIENT
Start: 2019-04-04 | End: 2019-04-05

## 2019-04-04 NOTE — PATIENT INSTRUCTIONS
Okay to take ibuprofen 200 mg - 4 tablets (800 mg) every 8 hours as needed.  Okay to take tylenol 500 mg - 2 tablets (1000 mg) every 6-8 hours as needed, do not exceed 3000 mg in 24 hours.  Take full course of antibiotic for strep throat.    New toothbrush in 2 days.      Patient Education     Pharyngitis: Strep (Confirmed)    You have had a positive test for strep throat. Strep throat is a contagious illness. It is spread by coughing, kissing or by touching others after touching your mouth or nose. Symptoms include throat pain that is worse with swallowing, aching all over, headache, and fever. It is treated with antibiotic medicine. This should help you start to feel better in 1 to 2 days.  Home care    Rest at home. Drink plenty of fluids to you won't get dehydrated.    No work or school for the first 2 days of taking the antibiotics. After this time, you will not be contagious. You can then return to school or work if you are feeling better.     Take antibiotic medicine for the full 10 days, even if you feel better. This is very important to ensure the infection is treated. It is also important to prevent medicine-resistant germs from developing. If you were given an antibiotic shot, you don't need any more antibiotics.    You may use acetaminophen or ibuprofen to control pain or fever, unless another medicine was prescribed for this. Talk with your healthcare provider before taking these medicines if you have chronic liver or kidney disease. Also talk with your healthcare provider if you have had a stomach ulcer or GI bleeding.    Throat lozenges or sprays help reduce pain. Gargling with warm saltwater will also reduce throat pain. Dissolve 1/2 teaspoon of salt in 1 glass of warm water. This may be useful just before meals.     Soft foods are OK. Don't eat salty or spicy foods.  Follow-up care  Follow up with your healthcare provider or our staff if you don't get better over the next week.  When to seek medical  advice  Call your healthcare provider right away if any of these occur:    Fever of 100.4 F (38 C) or higher, or as directed by your healthcare provider    New or worsening ear pain, sinus pain, or headache    Painful lumps in the back of neck    Stiff neck    Lymph nodes getting larger or becoming soft in the middle    You can't swallow liquids or you can't open your mouth wide because of throat pain    Signs of dehydration. These include very dark urine or no urine, sunken eyes, and dizziness.    Trouble breathing or noisy breathing    Muffled voice    Rash  Prevention  Here are steps you can take to help prevent an infection:    Keep good hand washing habits.    Don t have close contact with people who have sore throats, colds, or other upper respiratory infections.    Don t smoke, and stay away from secondhand smoke.  Date Last Reviewed: 11/1/2017 2000-2018 [employer] [address] [place] [address]. All rights reserved. This information is not intended as a substitute for professional medical care. Always follow your healthcare professional's instructions.

## 2019-04-04 NOTE — PROGRESS NOTES
SUBJECTIVE:   Mere Whelan is a 28 year old female presenting with a chief complaint of sore throat, body aches, swollen glands.  No rash, denies significant cough or congestion.  Onset of symptoms was 1 day(s) ago.  Course of illness is worsening.    Severity moderate  Current and Associated symptoms: sore throat, swollen glands, fatigue, body aches  Treatment measures tried include Tylenol/Ibuprofen, Fluids and Rest.  Predisposing factors include exposure to strep.    Past Medical History:   Diagnosis Date     LSIL (low grade squamous intraepithelial lesion) on Pap smear 9/27/12     Current Outpatient Medications   Medication Sig Dispense Refill     etonogestrel (IMPLANON/NEXPLANON) 68 MG IMPL 1 each (68 mg) by Subdermal route once for 1 dose 1 each 0     norelgestromin-ethinyl estradiol (ORTHO EVRA) 150-35 MCG/24HR patch Place 1 patch onto the skin once a week 3 patch 12     Social History     Tobacco Use     Smoking status: Never Smoker     Smokeless tobacco: Never Used   Substance Use Topics     Alcohol use: No       ROS:  Review of systems negative except as stated above.    OBJECTIVE:  /62 (BP Location: Right arm, Patient Position: Left side, Cuff Size: Adult Small)   Pulse 88   Temp 98.2  F (36.8  C)   Resp 20   Wt 63.5 kg (140 lb)   SpO2 97%   BMI 23.30 kg/m    GENERAL APPEARANCE: healthy, alert and no distress  EYES: EOMI,  PERRL, conjunctiva clear  HENT: ear canals and TM's normal.  Nose and mouth without ulcers, erythema or lesions.  Pharynx mildly pink, no exudates  NECK: supple, nontender, no lymphadenopathy  RESP: lungs clear to auscultation - no rales, rhonchi or wheezes  CV: regular rates and rhythm, normal S1 S2, no murmur noted  SKIN: no suspicious lesions or rashes    Results for orders placed or performed in visit on 04/04/19   Rapid strep screen   Result Value Ref Range    Specimen Description Throat     Rapid Strep A Screen (A)      POSITIVE: Group A Streptococcal antigen  detected by immunoassay.       ASSESSMENT/PLAN:  (J02.0) Acute streptococcal pharyngitis  (primary encounter diagnosis)  Plan: penicillin V (VEETID) 500 MG tablet            (J02.9) Sore throat  Comment: strep positive  Plan: Rapid strep screen            RX Pen V given, reviewed symptomatic treatment with tylenol, ibuprofen, plenty of fluids and rest.  Reviewed that is still contagious for the next 24-48 hours while on antibiotic, new toothbrush in 2 days.    Work excuse note given to be off work  Follow up with primary provider if no improvement in 1 week    Josue Daly MD  April 4, 2019 11:21 AM

## 2019-04-04 NOTE — LETTER
April 4, 2019      Mere Whelan  [address] [address]        To Whom It May Concern:    Mere Whelan  was seen on 4/4.  Please excuse her from work 4/4-4/5 due to illness, positive for strep throat.        Sincerely,        Josue Daly MD

## 2019-04-05 DIAGNOSIS — J02.0 ACUTE STREPTOCOCCAL PHARYNGITIS: ICD-10-CM

## 2019-04-05 NOTE — TELEPHONE ENCOUNTER
Requested Prescriptions   Pending Prescriptions Disp Refills     penicillin V (VEETID) 500 MG tablet [Pharmacy Med Name: PENICILLIN  MG TABLET]  Last Written Prescription Date:  04/04/2019  Last Fill Quantity: 20 tablet,  # refills: 0   Last office visit: No previous visit found with prescribing provider:  Josue Daly MD    Future Office Visit:     20 tablet 0     Sig: TAKE 1 TABLET (500 MG) BY MOUTH 2 TIMES DAILY FOR 10 DAYS    There is no refill protocol information for this order

## 2019-04-05 NOTE — TELEPHONE ENCOUNTER
Patient called she needs another rx lost the meds she just cant find her meds and would like another rx I told her that the provider that she saw was [facility] provider I was not sure how they would do this but the would work on it and advised patient to call her PCP

## 2019-04-08 NOTE — TELEPHONE ENCOUNTER
Patient lost medication.     Patient does not have a primary. Has not been seen in Clinic since 2017.     Routing back to the URGENT CARE provider.     Rita Ellison RN Flex

## 2019-04-10 RX ORDER — PENICILLIN V POTASSIUM 500 MG/1
500 TABLET, FILM COATED ORAL 2 TIMES DAILY
Qty: 20 TABLET | Refills: 0 | Status: SHIPPED | OUTPATIENT
Start: 2019-04-10 | End: 2019-04-20

## 2020-02-23 ENCOUNTER — HEALTH MAINTENANCE LETTER (OUTPATIENT)
Age: 30
End: 2020-02-23

## 2020-08-07 ENCOUNTER — TELEPHONE (OUTPATIENT)
Dept: OBGYN | Facility: CLINIC | Age: 30
End: 2020-08-07

## 2020-08-07 NOTE — TELEPHONE ENCOUNTER
[facility] Call Center    Phone Message    May a detailed message be left on voicemail: yes     Reason for Call: Patient called wanting to discuss how long her Nexplanon lasts. She has had it in for 3 years and wants to make sure she is still protected. Please advise. Thank you.    Action Taken: Message routed to:  [facility] p [phone]    Travel Screening: Not Applicable

## 2020-08-12 ENCOUNTER — OFFICE VISIT (OUTPATIENT)
Dept: OBGYN | Facility: CLINIC | Age: 30
End: 2020-08-12
Payer: COMMERCIAL

## 2020-08-12 VITALS
DIASTOLIC BLOOD PRESSURE: 64 MMHG | BODY MASS INDEX: 24.3 KG/M2 | HEART RATE: 78 BPM | SYSTOLIC BLOOD PRESSURE: 120 MMHG | WEIGHT: 146 LBS

## 2020-08-12 DIAGNOSIS — Z97.5 NEXPLANON IN PLACE: ICD-10-CM

## 2020-08-12 DIAGNOSIS — Z30.46 ENCOUNTER FOR REMOVAL AND REINSERTION OF NEXPLANON: Primary | ICD-10-CM

## 2020-08-12 PROCEDURE — 11983 REMOVE/INSERT DRUG IMPLANT: CPT | Performed by: OBSTETRICS & GYNECOLOGY

## 2020-08-12 NOTE — PROGRESS NOTES
Mere Whelan is here for Nexplanon reinsertion. She had it placed 8/12/17 with Dr. Eugene for contraception.  She has been doing well with it. She generally does not get a period with it. It is due for removal so she would like to get it replaced.  She is fully aware of the most common side effect of Nexplanon; irregular, unpredictable vaginal bleeding.     We discussed risks, benefits, and alternatives including but not limited to:   Possibility of pregnancy.  Possibility of difficult removal.  She understands that the insert must be removed in 3 years, if not sooner.  Spotting or heavy bleeding.  Pain or infection during or after insertion.    There is no history of unresolved abnormal uterine bleeding.   She denies the possibility of pregnancy.     The patient has given consent to proceed with replacement of Nexplanon.  She wishes to proceed.  All questions answered.      /64 (BP Location: Right arm, Cuff Size: Adult Regular)   Pulse 78   Wt 66.2 kg (146 lb)   LMP  (LMP Unknown)   BMI 24.30 kg/m     This is a well appearing female in no acute distress. Answers questions and maintains eye contact appropriately.       PROCEDURE NOTE:  After informed and written consent was obtained and a patient time-out conducted, the patient was situated on the exam table in supine position with her left arm rotated over her head and resting on the exam table. Nexplanon site was prepped with betadine and injected with 1% lidocaine with epi. After adequate anesthetization, a #11 blade was used to carry out a 3 mm incision near the top of the Nexplanon. A sterile curved hemostat was used to remove the Nexplanon.   The Nexplanon was inserted without difficulty according to manufacturers instructions. Device was palpated by  Provider, but patient declined palpation. A pressure bandage was applied. The patient tolerated the procedure well.     Use condoms for STI protection, as Nexplanon does not protect from  STIs. Bruising, itching, and redness at the insertion site for several days is not uncommon. Again reviewed s/e of irregular, unpredictable vaginal bleeding. Removal in 3 years.     Lot number [id number] and expiration date 2022AUG06.  NDC#  7935-6937-78    Follow up as needed.  Maddison Mcnally MD

## 2020-11-09 ENCOUNTER — E-VISIT (OUTPATIENT)
Dept: URGENT CARE | Facility: URGENT CARE | Age: 30
End: 2020-11-09
Payer: COMMERCIAL

## 2020-11-09 DIAGNOSIS — Z20.828 EXPOSURE TO VIRAL DISEASE: Primary | ICD-10-CM

## 2020-11-09 PROCEDURE — 99421 OL DIG E/M SVC 5-10 MIN: CPT | Performed by: FAMILY MEDICINE

## 2020-11-09 NOTE — PATIENT INSTRUCTIONS
November 9, 2020    RE:  Mere Whelan                                                                                                                                                       [address] [address]        To whom it may concern:    I evaluated Mere Whelan on November 9, 2020. Mere Whelan should be excused from work/school until about November 11, 2020.      This return to work date is an approximate. Return to work should be at least 10 days from symptom onset (20 days if people have immune compromise) and people should be fever-free for 24 hours without medications, and the respiratory symptoms should be improved significantly before returning to work or school.     You do not need to be retested to return to work.     Sincerely,  Jonathon Mercado MD

## 2020-12-13 ENCOUNTER — HEALTH MAINTENANCE LETTER (OUTPATIENT)
Age: 30
End: 2020-12-13

## 2020-12-14 ENCOUNTER — VIRTUAL VISIT (OUTPATIENT)
Dept: FAMILY MEDICINE | Facility: CLINIC | Age: 30
End: 2020-12-14
Payer: COMMERCIAL

## 2020-12-14 DIAGNOSIS — Z86.16 HISTORY OF 2019 NOVEL CORONAVIRUS DISEASE (COVID-19): Primary | ICD-10-CM

## 2020-12-14 PROCEDURE — 99207 PR NO BILLABLE SERVICE THIS VISIT: CPT | Performed by: FAMILY MEDICINE

## 2020-12-14 NOTE — PROGRESS NOTES
"Mere Whelan is a 30 year old female who is being evaluated via a billable video visit.      The patient has been notified of following:     \"This video visit will be conducted via a call between you and your physician/provider. We have found that certain health care needs can be provided without the need for an in-person physical exam.  This service lets us provide the care you need with a video conversation.  If a prescription is necessary we can send it directly to your pharmacy.  If lab work is needed we can place an order for that and you can then stop by our lab to have the test done at a later time.    Video visits are billed at different rates depending on your insurance coverage.  Please reach out to your insurance provider with any questions.    If during the course of the call the physician/provider feels a video visit is not appropriate, you will not be charged for this service.\"    Patient has given verbal consent for Video visit? Yes  How would you like to obtain your AVS? MyChart  If you are dropped from the video visit, the video invite should be resent to: Text to cell phone: [phone]  Will anyone else be joining your video visit? No      Subjective     Mere Whelan is a 30 year old female who presents today via video visit for the following health issues:    HPI      COVID RECHECK  Diagnosed 12/1/2020    The family was diagnosed ( and two kids) as well.  They all seem to have made a full recovery, yet pt is still experiencing symptoms. Current symptoms; No taste, headache, shortness of breath, pain after eating, Extreme fatigue and body aches. Sleeping 16-20 hours per day.           Positive for Covid-19 x 2 weeks. Would like to know how long she can be anticipating continued symptoms     Video Start Time: 1340        Review of Systems   Constitutional, HEENT, cardiovascular, pulmonary, gi and gu systems are negative, except as otherwise noted.      Objective     "       Vitals:  No vitals were obtained today due to virtual visit.    Physical Exam     GENERAL: alert, no distress and fatigued  EYES: Eyes grossly normal to inspection.  No discharge or erythema, or obvious scleral/conjunctival abnormalities.  RESP: No audible wheeze, cough, or visible cyanosis.  No visible retractions or increased work of breathing.    SKIN: Visible skin clear. No significant rash, abnormal pigmentation or lesions.  NEURO: Cranial nerves grossly intact.  Mentation and speech appropriate for age.  PSYCH: Mentation appears normal, affect normal/bright, judgement and insight intact, normal speech and appearance well-groomed.            Assessment & Plan     History of 2019 novel coronavirus disease (COVID-19)  30-year-old female, diagnosed over 2 weeks ago with Covid, symptoms started over Thanksgiving weekend.  Her entire family has recovered from the Covid.  She continues to have significant fatigue, shortness of breath.  She requires an examination, well past her quarantine..  We will see her in clinic this week.  She will bring short-term disability paperwork with her.  Recommended rest, good hydration, healthy diet.            Return in about 2 days (around 12/16/2020) for Follow Up from Today's Encounter.    Brayden Banegas MD  [facility]      Video-Visit Details    Type of service:  Video Visit    Video End Time:1355    Originating Location (pt. Location): Home    Distant Location (provider location):  [facility]     Platform used for Video Visit: [employer]

## 2020-12-16 NOTE — PROGRESS NOTES
"Subjective  Answers for HPI/ROS submitted by the patient on 12/17/2020   Chronic problems general questions HPI Form  If you checked off any problems, how difficult have these problems made it for you to do your work, take care of things at home, or get along with other people?: Extremely difficult  PHQ9 TOTAL SCORE: 20  AMARJIT 7 TOTAL SCORE: 7      Mere hWelan is a 30 year old female who presents to clinic today for the following health issues:    History of Present Illness       She eats 2-3 servings of fruits and vegetables daily.She consumes 0 sweetened beverage(s) daily.She exercises with enough effort to increase her heart rate 9 or less minutes per day.  She exercises with enough effort to increase her heart rate 3 or less days per week.   She is taking medications regularly.           Acute Illness  Acute illness concerns: 12/1/2020    Symptoms:  Fever: no  Decreased energy level: YES  Conjunctivitis: no  Ear Pain: no  Rhinorrhea: no  Congestion: no  Sore Throat: no  Cough: no  Wheeze: no  Breathing fast: YES SOB           Decreased Appetite/Intake: YES  Nausea: no  Vomiting: no  Diarrhea: no  Progression of Symptoms: same better  Therapies tried and outcome: None      Review of Systems   Constitutional, HEENT, cardiovascular, pulmonary, gi and gu systems are negative, except as otherwise noted.      Objective    /62   Pulse 81   Temp 98.1  F (36.7  C) (Temporal)   Resp 18   Ht 1.664 m (5' 5.5\")   Wt 64.6 kg (142 lb 8 oz)   SpO2 99%   BMI 23.35 kg/m    Body mass index is 23.35 kg/m .  Physical Exam   GENERAL: healthy, alert and no distress  EYES: Eyes grossly normal to inspection, PERRL and conjunctivae and sclerae normal  HENT: ear canals and TM's normal, nose and mouth without ulcers or lesions  NECK: no adenopathy, no asymmetry, masses, or scars and thyroid normal to palpation  RESP: lungs clear to auscultation - no rales, rhonchi or wheezes  CV: regular rate and rhythm, normal S1 S2, " no S3 or S4, no murmur, click or rub, no peripheral edema and peripheral pulses strong  ABDOMEN: soft, nontender, no hepatosplenomegaly, no masses and bowel sounds normal  MS: no gross musculoskeletal defects noted, no edema  NEURO: Normal strength and tone, mentation intact and speech normal  PSYCH: mentation appears normal, affect normal/bright    CXR - Normal- no infiltrates, effusions, pneumothoraces, cardiomegaly or masses        Assessment & Plan     SOB (shortness of breath)  30-year-old female with history of Covid diagnosis, approximately 3 weeks ago.  Continued fatigue, shortness of breath.  Examination today was reassuring, chest x-ray was normal.  She has not been eating much since the diagnosis.  Recommended supplemental shakes 3 times a day, increase activity as able.  Follow-up no improvement or any worsening.  - XR Chest 2 Views    History of 2019 novel coronavirus disease (COVID-19)  See above.  - XR Chest 2 Views  - [facility]            Return in about 4 weeks (around 1/14/2021) for Annual Well Check.    Brayden Banegas MD  [facility]

## 2020-12-17 ENCOUNTER — TELEPHONE (OUTPATIENT)
Dept: FAMILY MEDICINE | Facility: CLINIC | Age: 30
End: 2020-12-17

## 2020-12-17 ENCOUNTER — ANCILLARY PROCEDURE (OUTPATIENT)
Dept: GENERAL RADIOLOGY | Facility: CLINIC | Age: 30
End: 2020-12-17
Attending: FAMILY MEDICINE
Payer: COMMERCIAL

## 2020-12-17 ENCOUNTER — OFFICE VISIT (OUTPATIENT)
Dept: FAMILY MEDICINE | Facility: CLINIC | Age: 30
End: 2020-12-17
Payer: COMMERCIAL

## 2020-12-17 VITALS
BODY MASS INDEX: 22.9 KG/M2 | RESPIRATION RATE: 18 BRPM | OXYGEN SATURATION: 99 % | WEIGHT: 142.5 LBS | SYSTOLIC BLOOD PRESSURE: 108 MMHG | HEIGHT: 66 IN | DIASTOLIC BLOOD PRESSURE: 62 MMHG | HEART RATE: 81 BPM | TEMPERATURE: 98.1 F

## 2020-12-17 DIAGNOSIS — R06.02 SOB (SHORTNESS OF BREATH): Primary | ICD-10-CM

## 2020-12-17 DIAGNOSIS — Z86.16 HISTORY OF 2019 NOVEL CORONAVIRUS DISEASE (COVID-19): ICD-10-CM

## 2020-12-17 PROCEDURE — 71046 X-RAY EXAM CHEST 2 VIEWS: CPT | Mod: FY | Performed by: RADIOLOGY

## 2020-12-17 PROCEDURE — 99213 OFFICE O/P EST LOW 20 MIN: CPT | Performed by: FAMILY MEDICINE

## 2020-12-17 ASSESSMENT — ANXIETY QUESTIONNAIRES
5. BEING SO RESTLESS THAT IT IS HARD TO SIT STILL: NOT AT ALL
GAD7 TOTAL SCORE: 7
1. FEELING NERVOUS, ANXIOUS, OR ON EDGE: MORE THAN HALF THE DAYS
3. WORRYING TOO MUCH ABOUT DIFFERENT THINGS: NOT AT ALL
7. FEELING AFRAID AS IF SOMETHING AWFUL MIGHT HAPPEN: NEARLY EVERY DAY
GAD7 TOTAL SCORE: 7
6. BECOMING EASILY ANNOYED OR IRRITABLE: SEVERAL DAYS
4. TROUBLE RELAXING: NOT AT ALL
7. FEELING AFRAID AS IF SOMETHING AWFUL MIGHT HAPPEN: NEARLY EVERY DAY
2. NOT BEING ABLE TO STOP OR CONTROL WORRYING: SEVERAL DAYS
GAD7 TOTAL SCORE: 7

## 2020-12-17 ASSESSMENT — PATIENT HEALTH QUESTIONNAIRE - PHQ9
SUM OF ALL RESPONSES TO PHQ QUESTIONS 1-9: 20
10. IF YOU CHECKED OFF ANY PROBLEMS, HOW DIFFICULT HAVE THESE PROBLEMS MADE IT FOR YOU TO DO YOUR WORK, TAKE CARE OF THINGS AT HOME, OR GET ALONG WITH OTHER PEOPLE: EXTREMELY DIFFICULT
SUM OF ALL RESPONSES TO PHQ QUESTIONS 1-9: 20

## 2020-12-17 ASSESSMENT — MIFFLIN-ST. JEOR: SCORE: 1365.19

## 2020-12-17 NOTE — TELEPHONE ENCOUNTER
Our goal is to have forms completed with 72 hours, however some forms may require a visit or additional information.    Who is the form from?: Jolie (if other please explain)  Where the form came from: form was faxed in  What clinic location was the form placed at?: [place]  Where the form was placed: placed in TC inbox     What number is listed as a contact on the form?: [phone]  Fax number to return form to:  [phone]        Call taken on 12/17/2020 at 12:03 PM by Jennifer Griffin

## 2020-12-17 NOTE — PATIENT INSTRUCTIONS
Patient Education     [employer] Video Sheets  Dealing With the Stress of Self-Isolation  You're staying home, but you feel lonely, stressed and anxious. Let's look at some simple ways to manage self-isolation.  To watch the video:  Scan the QR code  Using your mobile device, scan the following code:       OR  Go to the website:  www.CaseReader  Enter the prescription code:   PVO     2020 [employer].

## 2020-12-18 ASSESSMENT — ANXIETY QUESTIONNAIRES: GAD7 TOTAL SCORE: 7

## 2020-12-18 ASSESSMENT — PATIENT HEALTH QUESTIONNAIRE - PHQ9: SUM OF ALL RESPONSES TO PHQ QUESTIONS 1-9: 20

## 2020-12-18 NOTE — TELEPHONE ENCOUNTER
Form completed and placed in  inbox.    Brayden Banegas MD, FAAFP  Family Medicine Physician  [facility] [place]  [address] [place] [address]

## 2020-12-28 ENCOUNTER — VIRTUAL VISIT (OUTPATIENT)
Dept: FAMILY MEDICINE | Facility: CLINIC | Age: 30
End: 2020-12-28
Payer: COMMERCIAL

## 2020-12-28 ENCOUNTER — TELEPHONE (OUTPATIENT)
Dept: FAMILY MEDICINE | Facility: CLINIC | Age: 30
End: 2020-12-28

## 2020-12-28 DIAGNOSIS — Z86.16 HISTORY OF 2019 NOVEL CORONAVIRUS DISEASE (COVID-19): Primary | ICD-10-CM

## 2020-12-28 PROCEDURE — 99213 OFFICE O/P EST LOW 20 MIN: CPT | Mod: 95 | Performed by: FAMILY MEDICINE

## 2020-12-28 NOTE — TELEPHONE ENCOUNTER
Reason for Call:  Form, our goal is to have forms completed with 72 hours, however, some forms may require a visit or additional information.    Type of letter, form or note:  disability    Who is the form from?: Patient    Where did the form come from: form was faxed in    What clinic location was the form placed at?: [facility] - [phone]    Where the form was placed: Forms Box/Folder    What number is listed as a contact on the form?: [phone]       Additional comments: Please review, sign, date and send all requested info to [phone]    Call taken on 12/28/2020 at 4:49 PM by Eli Valadez

## 2020-12-28 NOTE — PROGRESS NOTES
"Mere Whelan is a 30 year old female who is being evaluated via a billable video visit.      The patient has been notified of following:     \"This video visit will be conducted via a call between you and your physician/provider. We have found that certain health care needs can be provided without the need for an in-person physical exam.  This service lets us provide the care you need with a video conversation.  If a prescription is necessary we can send it directly to your pharmacy.  If lab work is needed we can place an order for that and you can then stop by our lab to have the test done at a later time.    Video visits are billed at different rates depending on your insurance coverage.  Please reach out to your insurance provider with any questions.    If during the course of the call the physician/provider feels a video visit is not appropriate, you will not be charged for this service.\"    Patient has given verbal consent for Video visit? Yes  How would you like to obtain your AVS? MyChart  If you are dropped from the video visit, the video invite should be resent to: Text to cell phone: [phone]  Will anyone else be joining your video visit? No      Subjective     Mere Whelan is a 30 year old female who presents today via video visit for the following health issues:    HPI        RECHECK SOB   Sick since about 12/1/2020    Working hard on eating well and adding protein to gain back some strength but still feeling very fatigued and weak. Pt is very frustrated and wants to get back to regular life.  Headaches are better, still feeling dizzy with activity.  Pt is having intermittent SOB.              Video Start Time: 1545      Review of Systems   Constitutional, HEENT, cardiovascular, pulmonary, gi and gu systems are negative, except as otherwise noted.      Objective           Vitals:  No vitals were obtained today due to virtual visit.    Physical Exam     GENERAL: Healthy, alert and no " distress  EYES: Eyes grossly normal to inspection.  No discharge or erythema, or obvious scleral/conjunctival abnormalities.  RESP: No audible wheeze, cough, or visible cyanosis.  No visible retractions or increased work of breathing.    SKIN: Visible skin clear. No significant rash, abnormal pigmentation or lesions.  NEURO: Cranial nerves grossly intact.  Mentation and speech appropriate for age.  PSYCH: Mentation appears normal, affect normal/bright, judgement and insight intact, normal speech and appearance well-groomed.        Assessment & Plan     History of 2019 novel coronavirus disease (COVID-19)  30-year-old female with history of Covid infection, has been slowly recovering.  Continues to improve.  She does still have a significant deconditioning, she is interested in physical rehab.  Send to physical therapy.  Follow-up if no continued improvement or any worsening.  - [facility], [facility] CH[facility]OPRACTIC REFERRAL; Future            Return in about 4 weeks (around 1/25/2021) for Annual Well Check.    Brayden Banegas MD  [facility]      Video-Visit Details    Type of service:  Video Visit    Video End Time:1604    Originating Location (pt. Location): Home    Distant Location (provider location):  [facility]     Platform used for Video Visit: [employer]aptiva

## 2020-12-29 NOTE — TELEPHONE ENCOUNTER
This will need to be completed by Dr. Banegas. As he is remote, please send by [employer] to complete if able.

## 2020-12-31 NOTE — TELEPHONE ENCOUNTER
Forms completed and faxed to requested by provider.  .  Brayden Banegas MD, FAAFP  Family Medicine Physician  [facility] [place]  [address] [place] [address]

## 2021-01-11 ENCOUNTER — VIRTUAL VISIT (OUTPATIENT)
Dept: FAMILY MEDICINE | Facility: CLINIC | Age: 31
End: 2021-01-11
Payer: COMMERCIAL

## 2021-01-11 ENCOUNTER — MYC MEDICAL ADVICE (OUTPATIENT)
Dept: FAMILY MEDICINE | Facility: CLINIC | Age: 31
End: 2021-01-11

## 2021-01-11 DIAGNOSIS — G93.31 POST VIRAL SYNDROME: Primary | ICD-10-CM

## 2021-01-11 PROCEDURE — 99212 OFFICE O/P EST SF 10 MIN: CPT | Mod: 95 | Performed by: FAMILY MEDICINE

## 2021-01-11 NOTE — PROGRESS NOTES
Mere De León is a 30 year old who is being evaluated via a billable video visit.      How would you like to obtain your AVS? MyChart  If the video visit is dropped, the invitation should be resent by: Text to cell phone: [phone]  Will anyone else be joining your video visit? No      Video Start Time: 1608  Assessment & Plan     Post viral syndrome  30-year-old female with history of Covid infection, significant fatigue, drawn out after infection.  She has been resting, requesting updated return to work.  We will have her return to work on a reduced schedule next week.  She is starting physical therapy this week, follow-up if no continued improvement or any worsening.    11 minutes spent on the date of the encounter doing chart review, patient visit and documentation          Return in about 4 weeks (around 2/8/2021) for Annual Well Check.    Brayden Banegas MD  [facility]    Subjective     Mere De León is a 30 year old who presents to clinic today for the following health issues     HPI     RECHECK SOB   Sick since about 12/1/2020     Short term disability will end soon and work is pressuring her to return to work. Will be starting PT this week for evaluation and treatment. Pt isn't feeling much better than last visit. Still fatigued, weak and dizzy with activity. Pt states she isn't pushing herself or doing to much but is nervous about returning back to work.       Review of Systems   Constitutional, HEENT, cardiovascular, pulmonary, gi and gu systems are negative, except as otherwise noted.      Objective           Vitals:  No vitals were obtained today due to virtual visit.    Physical Exam   GENERAL: Healthy, alert and no distress  EYES: Eyes grossly normal to inspection.  No discharge or erythema, or obvious scleral/conjunctival abnormalities.  RESP: No audible wheeze, cough, or visible cyanosis.  No visible retractions or increased work of breathing.    SKIN: Visible skin clear. No significant  rash, abnormal pigmentation or lesions.  NEURO: Cranial nerves grossly intact.  Mentation and speech appropriate for age.  PSYCH: Mentation appears normal, affect normal/bright, judgement and insight intact, normal speech and appearance well-groomed.            Video-Visit Details    Type of service:  Video Visit    Video End Time:1615    Originating Location (pt. Location): Home    Distant Location (provider location):  [facility]     Platform used for Video Visit: [employer]

## 2021-01-11 NOTE — LETTER
January 11, 2021      Mere Whelan  [address] [id number]        To Whom It May Concern:    Mere Whelan was seen in our clinic. She may return to work on 1/17/2021with the following restrictions: 50% of usual work hours for two weeks, followed by 1 week of 75% of usual work hours prior to returning to full duty.      Sincerely,        Brayden Banegas MD

## 2021-01-13 ENCOUNTER — MYC MEDICAL ADVICE (OUTPATIENT)
Dept: FAMILY MEDICINE | Facility: CLINIC | Age: 31
End: 2021-01-13

## 2021-01-13 ENCOUNTER — TELEPHONE (OUTPATIENT)
Dept: FAMILY MEDICINE | Facility: CLINIC | Age: 31
End: 2021-01-13

## 2021-01-13 DIAGNOSIS — G93.31 POST VIRAL SYNDROME: Primary | ICD-10-CM

## 2021-01-13 NOTE — TELEPHONE ENCOUNTER
Reason for Call:  Form, our goal is to have forms completed with 72 hours, however, some forms may require a visit or additional information.    Type of letter, form or note:  disability    Who is the form from?: Insurance comp    Where did the form come from: form was faxed in    What clinic location was the form placed at?: [facility] - [phone]    Where the form was placed: TC/MA Box/Folder    What number is listed as a contact on the form?: [phone]       Additional comments: Please complete form and return to [phone]    Call taken on 1/13/2021 at 2:34 PM by Raissa Dias

## 2021-01-14 ENCOUNTER — HOSPITAL ENCOUNTER (OUTPATIENT)
Dept: PHYSICAL THERAPY | Facility: CLINIC | Age: 31
Setting detail: THERAPIES SERIES
End: 2021-01-14
Attending: FAMILY MEDICINE
Payer: COMMERCIAL

## 2021-01-14 DIAGNOSIS — Z86.16 HISTORY OF 2019 NOVEL CORONAVIRUS DISEASE (COVID-19): ICD-10-CM

## 2021-01-14 DIAGNOSIS — G93.31 POST VIRAL SYNDROME: ICD-10-CM

## 2021-01-14 PROCEDURE — U0005 INFEC AGEN DETEC AMPLI PROBE: HCPCS | Performed by: FAMILY MEDICINE

## 2021-01-14 PROCEDURE — 97162 PT EVAL MOD COMPLEX 30 MIN: CPT | Mod: GP | Performed by: PHYSICAL THERAPIST

## 2021-01-14 PROCEDURE — 97112 NEUROMUSCULAR REEDUCATION: CPT | Mod: GP | Performed by: PHYSICAL THERAPIST

## 2021-01-14 PROCEDURE — U0003 INFECTIOUS AGENT DETECTION BY NUCLEIC ACID (DNA OR RNA); SEVERE ACUTE RESPIRATORY SYNDROME CORONAVIRUS 2 (SARS-COV-2) (CORONAVIRUS DISEASE [COVID-19]), AMPLIFIED PROBE TECHNIQUE, MAKING USE OF HIGH THROUGHPUT TECHNOLOGIES AS DESCRIBED BY CMS-2020-01-R: HCPCS | Performed by: FAMILY MEDICINE

## 2021-01-14 PROCEDURE — 97110 THERAPEUTIC EXERCISES: CPT | Mod: GP | Performed by: PHYSICAL THERAPIST

## 2021-01-14 NOTE — TELEPHONE ENCOUNTER
Form completed and placed in  inbox.    Brayden Banegas MD, FAAFP  Family Medicine Physician  [facility] [place]  [address] [address]

## 2021-01-14 NOTE — PROGRESS NOTES
01/14/21 0800   Quick Adds   Quick Adds Vestibular Eval   Type of Visit Initial OP PT Evaluation   General Information   Start of Care Date 01/14/21   Referring Physician Brayden Banegas MD    Orders Evaluate and Treat as Indicated   Order Date 12/28/20   Medical Diagnosis Post COVID Exercise Recovery History of 2019 novel coronavirus disease (COVID-19) Z86.19   Onset of illness/injury or Date of Surgery 12/28/20  (Referral date used)   Precautions/Limitations fall precautions   Surgical/Medical history reviewed Yes   Pertinent history of current vestibular problem (include personal factors and/or comorbidities that impact the POC)  Depression;Motion sickness   Pertinent history of current problem (include personal factors and/or comorbidities that impact the POC) Patient has a PMH of depression, c section. Patient has a history of COVID 19 infection in November-December of 2020 with significant deconditioning and fatigue. She has been slowly improving but does not have energy to do anything. When she tries to push herself she gets dizzy, hears bells, gets SOB. Her dizziness feels like the room is spinning when she gets up too quickly, feels like she is going to fall and needs to sit. Patient feels like she lacks energy, feels shortness of breath with walking short distances in the community. Patient is not monitoring her blood pressure or oxygen, has not had any falls and denies near syncope.    Pertinent Visual History  Blurry vision with head movements   Prior level of function comment Prior to COVID she was working 2 jobs, has 2 boys under 7 years old, liked to swim and do the stair stepper at least 2x per week.    Current Community Support Family/friend caregiver  (Spouse)   Patient role/Employment history Employed;Disabled   Living environment [place]   Home/Community Accessibility Comments Lives with her spouse and 2 sons, has a flight of stairs at home that were difficult initially but now she can  do them without trouble   Assistive Devices Comments None   Patient/Family Goals Statement Get back to normal, resolve dizziness, improve energy   General Information Comments Supposed to return to work on Sunday -  with needs to be up on her feet throughout the day, able to lift up to 50lb, 8-10 hours per day, 5 days per week. Planning on returning 50% for the first 2 weeks, 75% for the 3rd week and full time by the 4th week.    Fall Risk Screen   Fall screen completed by PT   Have you fallen 2 or more times in the past year? No   Have you fallen and had an injury in the past year? No   Timed Up and Go score (seconds) NT, see FGA   Is patient a fall risk? No   Pain   Patient currently in pain No   Vitals Signs   Heart Rate 68   SpO2 97   Blood Pressure 105/66   Vital Signs Comments Seated, rest, left arm   Cognitive Status Examination   Orientation orientation to person, place and time   Level of Consciousness alert;lethargic/somnolent  (General malaise)   Follows Commands and Answers Questions 100% of the time;able to follow multistep instructions   Personal Safety and Judgment intact   Memory intact   Integumentary   Integumentary No deficits were identified   Posture   Posture Forward head position   Strength   Strength Comments MMT performed in sitting, B LE grossly 3+/5   Bed Mobility   Bed Mobility Comments IND, occasional dizziness with standing too quickly   Transfer Skills   Transfer Comments Dizziness with standing too quickly, impaired functional LE strength    Gait   Gait Comments Patient ambulates with decreased gait speed and quick onset of SOB and fatigue   Balance   Balance Comments Impaired balance with excessive postural sway during Rhomberg with eyes closed   Balance Special Tests   Balance Special Tests Sit to stand reps;Romberg   Balance Special Tests Romberg   Seconds 30 Seconds   Comments Eyes closed: 23 sec with excessive postural sway and dizziness   Balance Special Tests Sit  to Stand Reps in 30 Seconds   Reps in 30 seconds 8   Height 18 inches   Comments SOB after 4 reps, 97% oxygen saturation   Sensory Examination   Sensory Perception no deficits were identified   Coordination   Coordination no deficits were identified   Muscle Tone   Muscle Tone no deficits were identified   Cervicogenic Screen   Neck ROM WNL, no dizziness   Oculomotor Exam   Smooth Pursuit Normal   Saccades Normal   VOR Other   VOR Comments Onset of dizziness   VOR Cancellation Other   VOR Cancellation Comments Onset of dizziness   Rapid Head Thrust Corrective Saccade R head thrust   Convergence Testing Abnormal   Convergence Testing Comments Loss of conjunctive eye movement at 24 cm, onset of dizziness   Infrared Goggle Exam or Frenzel Lense Exam   Vestibular Suppressant in Last 24 Hours? No   Dynamic Visual Acuity (DVA)   Static Acuity (LogMar) 0.1   Horizontal Head Movement at 1 Hz (LogMar) 0.8   Horizontal Head Movement at 2 Hz (LogMar) 0.9   DVA Comments Abnormal degradation of 8 linesy, significant dizziness   Modality Interventions   Planned Modality Interventions Comments Per therapist discretion   Planned Therapy Interventions   Planned Therapy Interventions balance training;gait training;neuromuscular re-education;ROM;strengthening;stretching  (Vestibular therapy)   Clinical Impression   Criteria for Skilled Therapeutic Interventions Met yes, treatment indicated   PT Diagnosis Deconditioning and impaired activity tolerance   Influenced by the following impairments Impaired balance, strength, endurance, dizziness, fatigue   Functional limitations due to impairments Significantly limited activity tolerance and community mobility, impaired safety and independence with bed mobility, transfers, ADLs, and functional mobility, unable to work at this time but scheduled to return at 50% on 1/17/21   Clinical Presentation Evolving/Changing   Clinical Presentation Rationale Medically stable but with complications of  deconditioning and dizziness following COVID infection   Clinical Decision Making (Complexity) Moderate complexity   Therapy Frequency 2 times/Week  (For 2 weeks then decreasing in frequency as indicated)   Predicted Duration of Therapy Intervention (days/wks) 10 weeks   Risk & Benefits of therapy have been explained Yes   Patient, Family & other staff in agreement with plan of care Yes   Clinical Impression Comments Patient is a 30 year old female presenting to physical therapy with deconditioning and dizziness following a recent COVID infection. Patient presents with significant LE weakness and impaired activity tolerance with quick onset of SOB during minimal exertion tasks such as repeated STS and walking for 2 minutes. Patient also presents with dizziness indicative of a unilateral hypofunction with impaired gaze stabilization and impaired dynamic gait and balance per the FGA. Patient may benefit from skilled physical therapy to improve her balance and dizziness, increase her strength and activity tolerance, and facilitate return to PLOF without limitation.    Education Assessment   Barriers to Learning   (Working, fatigue)   GOALS   PT Eval Goals 1;2;3;4;5   Goal 1   Goal Identifier HEP   Goal Description Patient will demonstrate understanding and compliance to her HEP for continued wellbeing upon discharge from skilled physical therapy.   Target Date 03/25/21   Goal 2   Goal Identifier DVA   Goal Description Patient will complete DVA testing with a loss of 4 lines or less between static and 2 Hz head movement to demonstrate improved gaze stability for return to activities without limitation.   Target Date 03/25/21   Goal 3   Goal Identifier FGA   Goal Description Patient will complete the FGA with a score of 30/30 to demonstrate improved balance and decreased risk for falls.   Target Date 03/25/21   Goal 4   Goal Identifier 6 MWT   Goal Description Patient will ambulate 1200 feet during the 6 MWT with no AD  and minimal SOB to demonstrate improved activity tolerance for independent and safe community ambulation.   Target Date 03/25/21   Goal 5   Goal Identifier 30 sec STS   Goal Description Patient will complete 14 STS transfers with no UE assist from an 18 inch surface in 30 seconds to demonstrate improved LE strength for independent transfers from low surfaces.   Target Date 03/25/21   Total Evaluation Time   PT Candy, Moderate Complexity Minutes ([id number]) 35

## 2021-01-14 NOTE — PROGRESS NOTES
01/14/21 0800   Signing Clinician's Name / Credentials   Signing clinician's name / credentials Johana Oneal DPT   Functional Gait Assessment (CHE Hinojosa, JOSE Bird, et al. (2004))   1. GAIT LEVEL SURFACE 3   2. CHANGE IN GAIT SPEED 3   3. GAIT WITH HORIZONTAL HEAD TURNS 2   4. GAIT WITH VERTICAL HEAD TURNS 2   5. GAIT AND PIVOT TURN 3   6. STEP OVER OBSTACLE 2   7. GAIT WITH NARROW BASE OF SUPPORT 2   8. GAIT WITH EYES CLOSED 2  (7 sec)   9. AMBULATING BACKWARDS 1  (11 sec)   10. STEPS 2   Total Functional Gait Assessment Score   TOTAL SCORE: (MAXIMUM SCORE 30) 22     Functional Gait Assessment (FGA): The FGA assesses postural stability during various walking tasks.   Gait assistive device used: None    Patient Score: 22/30  Scores of <22/30 have been correlated with predicting falls in community-dwelling older adults according to Micaela & Raul 2010.   Scores of <18/30 have been correlated with increased risk for falls in patients with Parkinsons Disease according to Deonte Hanson Zhou et al 2014.  Minimal Detectable Change for patients with acute/chronic stroke = 4.2 according to Maverick & Milyschharvey 2009  Minimal Detectable Change for patients with vestibular disorder = 8 according to Micaela & Raul 2010    Assessment (rationale for performing, application to patient s function & care plan): Assess risk for falls, no increased risk but impaired dynamic gait and balance with reproduction of dizziness.  Minutes billed as physical performance test: 0

## 2021-01-15 ENCOUNTER — TELEPHONE (OUTPATIENT)
Dept: LAB | Facility: CLINIC | Age: 31
End: 2021-01-15

## 2021-01-15 ENCOUNTER — MYC MEDICAL ADVICE (OUTPATIENT)
Dept: FAMILY MEDICINE | Facility: CLINIC | Age: 31
End: 2021-01-15

## 2021-01-15 LAB
LABORATORY COMMENT REPORT: ABNORMAL
SARS-COV-2 RNA RESP QL NAA+PROBE: NORMAL
SARS-COV-2 RNA RESP QL NAA+PROBE: POSITIVE
SPECIMEN SOURCE: ABNORMAL
SPECIMEN SOURCE: NORMAL

## 2021-01-15 NOTE — TELEPHONE ENCOUNTER
Spoke to patient. Reports this is her second positive Covid test. Declined re-education. Patient has no further questions @ this time.

## 2021-01-18 ENCOUNTER — VIRTUAL VISIT (OUTPATIENT)
Dept: FAMILY MEDICINE | Facility: CLINIC | Age: 31
End: 2021-01-18
Payer: COMMERCIAL

## 2021-01-18 DIAGNOSIS — G93.31 POST VIRAL SYNDROME: Primary | ICD-10-CM

## 2021-01-18 PROCEDURE — 99213 OFFICE O/P EST LOW 20 MIN: CPT | Mod: 95 | Performed by: FAMILY MEDICINE

## 2021-01-18 NOTE — PROGRESS NOTES
Mere De León is a 30 year old who is being evaluated via a billable video visit.      How would you like to obtain your AVS? MyChart  If the video visit is dropped, the invitation should be resent by: Text to cell phone: [phone]  Will anyone else be joining your video visit? No      Video Start Time: 1405  Assessment & Plan     Post viral syndrome  30-year-old female with post viral syndrome, initially diagnosed with Covid 7 weeks ago.  Her work required a repeat test, that came back positive.  We discussed that the test can be positive for 90+ days.  She is no longer infectious and does not require quarantine.  I sent a message to her physical therapist advising them of this so that she may resume her physical therapy.  She will return to work in 10 days in accordance with their policies.  She will follow-up no improvement or any worsening.      20 minutes spent on the date of the encounter doing chart review, review of test results, interpretation of tests, patient visit and documentation         Return in about 4 weeks (around 2/15/2021) for Annual Well Check.    Brayden Banegas MD  [facility]    Subjective     Mere De León is a 30 year old who presents to clinic today for the following health issues     HPI     Post COVID followup  12/1/2020    Lingering symptoms, sob not much better, needed a negative covid test before but the testing came back positive. Pt is concerned about returning to work.          Review of Systems   Constitutional, HEENT, cardiovascular, pulmonary, gi and gu systems are negative, except as otherwise noted.      Objective           Vitals:  No vitals were obtained today due to virtual visit.    Physical Exam   GENERAL: Healthy, alert and no distress  EYES: Eyes grossly normal to inspection.  No discharge or erythema, or obvious scleral/conjunctival abnormalities.  RESP: No audible wheeze, cough, or visible cyanosis.  No visible retractions or increased work of breathing.     SKIN: Visible skin clear. No significant rash, abnormal pigmentation or lesions.  NEURO: Cranial nerves grossly intact.  Mentation and speech appropriate for age.  PSYCH: Mentation appears normal, affect normal/bright, judgement and insight intact, normal speech and appearance well-groomed.    Orders Only on 01/14/2021   Component Date Value Ref Range Status     COVID-19 Virus PCR to [facility] - So* 01/14/2021 Nasopharyngeal   Final     COVID-19 Virus PCR to [facility] - Re* 01/14/2021 Test received-See reflex to [facility] test SARS CoV2 (COVID-19) Virus RT-PCR   Final     SARS-CoV-2 Virus Specimen Source 01/14/2021 Nasopharyngeal   Final     SARS-CoV-2 PCR Result 01/14/2021 POSITIVE*  Final    SARS-CoV2 (COVID-19) RNA detected, presumed positive.     SARS-CoV-2 PCR Comment 01/14/2021 Testing was performed using the daryl SARS-CoV-2 assay on the daryl [employer] System.2   Final    Comment: This test should be ordered for the detection of SARS-CoV-2 in individuals who   meet SARS-CoV-2 clinical and/or epidemiological criteria. Test performance is   unknown in asymptomatic patients.  This test is for in vitro diagnostic use under the FDA EUA for laboratories   certified under CLIA to perform high and/or moderate complexity testing. This   test has not been FDA cleared or approved.  A negative result does not rule out the presence of PCR inhibitors in the   specimen or target RNA in concentration below the limit of detection for the   assay. The possibility of a false negative should be considered if the   patient's recent exposure or clinical presentation suggests COVID-19.  This test was validated by the [facility]   Diagnostic Laboratory. This laboratory is certified under the Clinical   Laboratory Improvement Amendments of 1988 (CLIA-88) as qualified to perform   high and/or moderate complexity laboratory testing.                 Video-Visit Details    Type of service:  Video Visit    Video End  Time:1430    Originating Location (pt. Location): Home    Distant Location (provider location):  [facility]     Platform used for Video Visit: Laura

## 2021-01-22 ENCOUNTER — HOSPITAL ENCOUNTER (OUTPATIENT)
Dept: PHYSICAL THERAPY | Facility: CLINIC | Age: 31
Setting detail: THERAPIES SERIES
End: 2021-01-22
Attending: FAMILY MEDICINE
Payer: COMMERCIAL

## 2021-01-22 PROCEDURE — 97110 THERAPEUTIC EXERCISES: CPT | Mod: GP,GT | Performed by: PHYSICAL THERAPIST

## 2021-01-22 PROCEDURE — 97112 NEUROMUSCULAR REEDUCATION: CPT | Mod: GP,95 | Performed by: PHYSICAL THERAPIST

## 2021-01-22 NOTE — PROGRESS NOTES
Mere Whelan is a 30 year old female who is being seen via a billable video visit.      Patient has given verbal consent for Video visit? Yes    Video Start Time: 7:58am    Telehealth Visit Details    Type of Service:  Telehealth    Video End Time (time video stopped): 8:38am    Originating Location (pt. location): Home    Additional Participants in Telehealth Visit: None    Distant Location (provider location):  [facility]     Mode of Communication (Audio Visual or Audio Only):  Audio visual    Johana Oneal, PT, DPT  January 22, 2021

## 2021-01-25 ENCOUNTER — MYC MEDICAL ADVICE (OUTPATIENT)
Dept: FAMILY MEDICINE | Facility: CLINIC | Age: 31
End: 2021-01-25

## 2021-01-28 ENCOUNTER — HOSPITAL ENCOUNTER (OUTPATIENT)
Dept: PHYSICAL THERAPY | Facility: CLINIC | Age: 31
Setting detail: THERAPIES SERIES
End: 2021-01-28
Attending: FAMILY MEDICINE
Payer: COMMERCIAL

## 2021-01-28 PROCEDURE — 97110 THERAPEUTIC EXERCISES: CPT | Mod: GP,GT | Performed by: PHYSICAL THERAPIST

## 2021-01-28 PROCEDURE — 97112 NEUROMUSCULAR REEDUCATION: CPT | Mod: GP,95 | Performed by: PHYSICAL THERAPIST

## 2021-01-28 NOTE — PROGRESS NOTES
Mere Whelan is a 30 year old female who is being seen via a billable video visit.      Patient has given verbal consent for Video visit? Yes    Video Start Time: 8:04am    Telehealth Visit Details    Type of Service:  Telehealth    Video End Time (time video stopped): 8:44am    Originating Location (pt. location): Home    Additional Participants in Telehealth Visit: None    Distant Location (provider location):  [facility]     Mode of Communication (Audio Visual or Audio Only):  Audio visual    Johana Oneal, PT, DPT  January 28, 2021

## 2021-04-11 ENCOUNTER — HEALTH MAINTENANCE LETTER (OUTPATIENT)
Age: 31
End: 2021-04-11

## 2021-05-20 ENCOUNTER — TELEPHONE (OUTPATIENT)
Dept: FAMILY MEDICINE | Facility: CLINIC | Age: 31
End: 2021-05-20

## 2021-05-20 NOTE — TELEPHONE ENCOUNTER
Patient Quality Outreach Summary      Summary:    Patient is due/failing the following:   Cervical Cancer Screening - PAP Needed and Physical     Type of outreach:    Phone, spoke to patient/parent. scheduled    Questions for provider review:    None                                                                                                                    Dolly Sandoval CMA       Chart routed to Care Team.

## 2021-09-26 ENCOUNTER — HEALTH MAINTENANCE LETTER (OUTPATIENT)
Age: 31
End: 2021-09-26

## 2022-05-08 ENCOUNTER — HEALTH MAINTENANCE LETTER (OUTPATIENT)
Age: 32
End: 2022-05-08

## 2023-01-08 ENCOUNTER — HEALTH MAINTENANCE LETTER (OUTPATIENT)
Age: 33
End: 2023-01-08

## 2023-06-02 ENCOUNTER — HEALTH MAINTENANCE LETTER (OUTPATIENT)
Age: 33
End: 2023-06-02

## 2024-03-04 NOTE — PROGRESS NOTES
NEXPLANON REMOVAL:  Is a pregnancy test required: Yes.  Was it positive or negative?  Negative  Was a consent obtained?  Yes    Subjective: Mere Whelan is a 33 year old  presents for Nexplanon removal. She is interested in getting pregnant next year and her Nexplanon is . She does not want replacement today but would like to go back on the patch, which she previously used and liked. Due to her Nexplanon being , advised she take a home UPT 2 weeks out from last time of SIC. She is to start patches after that home negative UPT and use 7 days of back up birth control    Patient has been given the opportunity to ask questions about all forms of birth control, including all options appropriate for Mere Whelan. Discussed that no method of birth control, except abstinence is 100% effective against pregnancy or sexually transmitted infection.     Mere Whelan understands planning removal today    The entire removal procedure was reviewed with the patient, including care after removal.    /75 (BP Location: Left arm, Patient Position: Chair, Cuff Size: Adult Regular)   Pulse 78   Wt 61.5 kg (135 lb 8 oz)   SpO2 100%   BMI 22.21 kg/m      PROCEDURE NOTE: -- Nexplanon Removal    Preoperative Diagnosis: etonogestrel implant  Postoperative Diagnosis: etonogestrel implant removed    Technique: On the left arm  Skin prep Betadine  Anesthesia 1% lidocaine, with epi  Procedure: Small incision (<5mm) was made at distal end of palpable implant, curved hemostat or mosquito forceps was used to isolate the implant and bring it to the incision, the fibrous capsule containing the implant  was incised and the Implant was removed intact.    EBL: minimal  Complications:  No  Tolerance:  Pt tolerated procedure well and was in stable condition.   Dressing:    A pressure bandage was placed for the next 12-24 hours.    Patient desires patch at this time and will use until ready to get  pregnant next year. Advised she start PNV at least 2mo before getting pregnant. Discussed a variety of birth control options with the patient such as IUD, nexplanon, pills, NuvaRing and patch. She would like patch today.    Contraception was discussed and patient chose the following method Patch  The use of the oral contraceptive pill has been fully discussed with the patient. This includes the proper method to initiate and continue the patch, the need for regular compliance to ensure adequate contraceptive effect, the physiology which makes the patch effective, the instructions for what to do in event of a missed pill, and warnings about anticipated minor side effects such as breakthrough spotting, nausea, breast tenderness, weight changes, acne, headaches, etc. She was informed of the irregular bleeding pattern that can occur when the patch is first started or a new form is changed over for the first 2-3 months. She has been told of the more serious potential side effects such as MI, stroke, and deep vein thrombosis, all of which are very unlikely. She has been asked to report any signs of such serious problems immediately. She understands and wishes to take the medication as prescribed.     Denies history of or current migraines with aura, HTN, smoking, blood/stroke or clotting disorder, known ischemic heart disease, thrombogenic mutations, VTE, history of stroke, complicated valvular heart disease, liver disease, recent prolonged immobility, gallbladder disease, systemic lupus erythematosus or current breast/estrogen sensitive cancers.     Follow up: Pt was instructed to call if bleeding, severe pain or foul smell.     DRAKE Perez CNP

## 2024-03-04 NOTE — PATIENT INSTRUCTIONS
If you have labs or imaging done, the results will automatically release in [employer] without an interpretation.  Your health care professional will review those results and send an interpretation with recommendations as soon as possible, but this may be 1-3 business days.    If you have any questions regarding your visit, please contact your care team.     [employer]: [phone]  [facility] CLINIC HOURS TELEPHONE NUMBER   Gaby Cash, CYNTHIA Lin-MIKHAIL Peters-MIKHAIL Mccoy-Surgery Scheduler  Amy-       Monday- [place]  8:00 am-4:00 pm    Tuesday- [place]  8:00 am-4:00 pm    Wednesday- [place] 8:00 am-4:00 pm    Thursday- [place] 8:00 am-4:00 pm    Friday- [place]  8:00 am-4:00 pm [facility]  [address] [place]  [place] [address]  PH: [phone]  Fax: [phone]    Imaging Scheduling all locations  PH: [phone]     [facility]  [address] [place] [address] [address]  [place], [place] [address] [phone]    [facility]  [address] [place] [address]  PH: [phone]     **Surgeries** Our Surgery Schedulers will contact you to schedule. If you do not receive a call within 3 business days, please call [phone].  Urgent Care locations:  [facility]       Monday-Friday   10 am - 8 pm    Saturday and Sunday   9 am - 5 pm   [phone]   If you need a medication refill, please contact your pharmacy. Please allow 3 business days for your refill to be completed.  As always, Thank you for trusting us with your healthcare needs!

## 2024-03-06 ENCOUNTER — TELEPHONE (OUTPATIENT)
Dept: OBGYN | Facility: CLINIC | Age: 34
End: 2024-03-06

## 2024-03-06 ENCOUNTER — OFFICE VISIT (OUTPATIENT)
Dept: OBGYN | Facility: CLINIC | Age: 34
End: 2024-03-06
Payer: COMMERCIAL

## 2024-03-06 VITALS
OXYGEN SATURATION: 100 % | DIASTOLIC BLOOD PRESSURE: 75 MMHG | BODY MASS INDEX: 22.21 KG/M2 | WEIGHT: 135.5 LBS | SYSTOLIC BLOOD PRESSURE: 118 MMHG | HEART RATE: 78 BPM

## 2024-03-06 DIAGNOSIS — Z30.46 ENCOUNTER FOR NEXPLANON REMOVAL: Primary | ICD-10-CM

## 2024-03-06 DIAGNOSIS — Z30.019 ENCOUNTER FOR FEMALE BIRTH CONTROL: ICD-10-CM

## 2024-03-06 DIAGNOSIS — Z32.00 PREGNANCY EXAMINATION OR TEST, PREGNANCY UNCONFIRMED: ICD-10-CM

## 2024-03-06 LAB
HCG UR QL: NEGATIVE
INTERNAL QC OK POCT: NORMAL
POCT KIT EXPIRATION DATE: NORMAL
POCT KIT LOT NUMBER: NORMAL

## 2024-03-06 PROCEDURE — 99203 OFFICE O/P NEW LOW 30 MIN: CPT | Mod: 25

## 2024-03-06 PROCEDURE — 81025 URINE PREGNANCY TEST: CPT

## 2024-03-06 PROCEDURE — 11982 REMOVE DRUG IMPLANT DEVICE: CPT

## 2024-03-06 RX ORDER — NORELGESTROMIN AND ETHINYL ESTRADIOL 35; 150 UG/MG; UG/MG
PATCH TRANSDERMAL
Qty: 3 PATCH | Refills: 4 | Status: SHIPPED | OUTPATIENT
Start: 2024-03-06

## 2024-03-06 NOTE — TELEPHONE ENCOUNTER
Relayed Gaby's message below to pt.  Pt still wants to use the patch and she won't use it continuously.  She is fine having a period each month.    Pending pt's preferred pharmacy and routing to DRAKE Jacobs, to send in rx for the birth control patch.    Lorraine Roa RN

## 2024-03-06 NOTE — TELEPHONE ENCOUNTER
Can you please call patient and inform that after reading some more information about continuous use of the patch, there is controversy about the increase risk of clots with continuous use so I do not think it may be the best option at this time. She is welcome to use the patch and have a bleed every month. Let me know if she still wants this or if she wants a different form of birth control.     Thank you!  DRAKE Perez CNP

## 2024-03-29 ENCOUNTER — VIRTUAL VISIT (OUTPATIENT)
Dept: FAMILY MEDICINE | Facility: CLINIC | Age: 34
End: 2024-03-29
Payer: COMMERCIAL

## 2024-03-29 DIAGNOSIS — R05.2 SUBACUTE COUGH: ICD-10-CM

## 2024-03-29 DIAGNOSIS — J06.9 UPPER RESPIRATORY TRACT INFECTION, UNSPECIFIED TYPE: Primary | ICD-10-CM

## 2024-03-29 DIAGNOSIS — R11.0 NAUSEA: ICD-10-CM

## 2024-03-29 PROCEDURE — 99213 OFFICE O/P EST LOW 20 MIN: CPT | Mod: 95 | Performed by: STUDENT IN AN ORGANIZED HEALTH CARE EDUCATION/TRAINING PROGRAM

## 2024-03-29 RX ORDER — METHYLPREDNISOLONE 4 MG
TABLET, DOSE PACK ORAL
Qty: 21 TABLET | Refills: 0 | Status: SHIPPED | OUTPATIENT
Start: 2024-03-29

## 2024-03-29 RX ORDER — BENZONATATE 200 MG/1
200 CAPSULE ORAL 3 TIMES DAILY PRN
Qty: 60 CAPSULE | Refills: 0 | Status: SHIPPED | OUTPATIENT
Start: 2024-03-29

## 2024-03-29 RX ORDER — ONDANSETRON 4 MG/1
4 TABLET, ORALLY DISINTEGRATING ORAL EVERY 6 HOURS PRN
Qty: 25 TABLET | Refills: 0 | Status: SHIPPED | OUTPATIENT
Start: 2024-03-29

## 2024-03-29 NOTE — PROGRESS NOTES
Mere is a 33 year old who is being evaluated via a billable video visit.    How would you like to obtain your AVS? MyChart  If the video visit is dropped, the invitation should be resent by: Text to cell phone: [phone]  Will anyone else be joining your video visit? No      Assessment & Plan     (J06.9) Upper respiratory tract infection, unspecified type  (primary encounter diagnosis)  Comment: Acute, uncontrolled. Recommendation for pt to be treated as having a bacterial infection. Will send prescriptions for medrol dose monico and augmentin  Plan: amoxicillin-clavulanate (AUGMENTIN) 875-125 MG         tablet, methylPREDNISolone (MEDROL DOSEPAK) 4         MG tablet therapy pack            (R05.2) Subacute cough  Comment: Acute, uncontrolled  Plan: benzonatate (TESSALON) 200 MG capsule,         amoxicillin-clavulanate (AUGMENTIN) 875-125 MG         tablet, methylPREDNISolone (MEDROL DOSEPAK) 4         MG tablet therapy pack            (R11.0) Nausea  Comment: Acute, uncontrolled  Plan: ondansetron (ZOFRAN ODT) 4 MG ODT tab            ED warning precautions provided                      Subjective   Mere is a 33 year old, presenting for the following health issues:  Nausea    History of Present Illness       Reason for visit:  Ira been sick for 3+weeks and not getting any better.  Symptom onset:  3-4 weeks ago  Symptoms include:  Flu like sytoms, nausea,sneezing, pain, werid odor and more  Symptom intensity:  Severe  Symptom progression:  Worsening  Had these symptoms before:  No    She eats 2-3 servings of fruits and vegetables daily.She consumes 2 sweetened beverage(s) daily.She exercises with enough effort to increase her heart rate 30 to 60 minutes per day.  She exercises with enough effort to increase her heart rate 3 or less days per week.   She is taking medications regularly.     Pt reports being sick since march 7th. She reports over the last week things have worsen and she endorses having pain in her chest  and coughing. She reports two covid tests at home which were negative. She endorses having her birth control removed from arm and switched to patches. She endorses having lost her voice.   She endorses her sons were sick on march 6 and 7th.                  Objective           Vitals:  No vitals were obtained today due to virtual visit.    Physical Exam   GENERAL: alert and no distress  EYES: Eyes grossly normal to inspection.  No discharge or erythema, or obvious scleral/conjunctival abnormalities.  RESP: No audible wheeze, cough, or visible cyanosis.    SKIN: Visible skin clear. No significant rash, abnormal pigmentation or lesions.  NEURO: Cranial nerves grossly intact.  Mentation and speech appropriate for age.  PSYCH: Appropriate affect, tone, and pace of words          Video-Visit Details    Type of service:  Video Visit   Originating Location (pt. Location): Home    Distant Location (provider location):  On-site  Platform used for Video Visit: Laura  Signed Electronically by: OLGA DAY MD

## 2024-04-27 DIAGNOSIS — Z30.019 ENCOUNTER FOR FEMALE BIRTH CONTROL: ICD-10-CM

## 2024-06-30 ENCOUNTER — HEALTH MAINTENANCE LETTER (OUTPATIENT)
Age: 34
End: 2024-06-30

## 2024-07-26 RX ORDER — NORELGESTROMIN AND ETHINYL ESTRADIOL 150; 35 UG/D; UG/D
PATCH TRANSDERMAL
Qty: 9 PATCH | Refills: 2 | OUTPATIENT
Start: 2024-07-26

## 2025-07-13 ENCOUNTER — HEALTH MAINTENANCE LETTER (OUTPATIENT)
Age: 35
End: 2025-07-13